# Patient Record
Sex: MALE | Race: BLACK OR AFRICAN AMERICAN | Employment: UNEMPLOYED | URBAN - METROPOLITAN AREA
[De-identification: names, ages, dates, MRNs, and addresses within clinical notes are randomized per-mention and may not be internally consistent; named-entity substitution may affect disease eponyms.]

---

## 2018-02-22 ENCOUNTER — HOSPITAL ENCOUNTER (INPATIENT)
Age: 28
LOS: 11 days | Discharge: HOME | DRG: 885 | End: 2018-03-05
Attending: PSYCHIATRY & NEUROLOGY | Admitting: STUDENT IN AN ORGANIZED HEALTH CARE EDUCATION/TRAINING PROGRAM
Payer: MEDICARE

## 2018-02-22 PROBLEM — F20.9 SCHIZOPHRENIA (HCC): Status: ACTIVE | Noted: 2018-02-22

## 2018-02-22 PROCEDURE — 65220000003 HC RM SEMIPRIVATE PSYCH

## 2018-02-22 RX ORDER — LORAZEPAM 1 MG/1
1-2 TABLET ORAL
Status: DISCONTINUED | OUTPATIENT
Start: 2018-02-22 | End: 2018-03-05 | Stop reason: HOSPADM

## 2018-02-22 RX ORDER — LORAZEPAM 2 MG/ML
1-2 INJECTION INTRAMUSCULAR
Status: DISCONTINUED | OUTPATIENT
Start: 2018-02-22 | End: 2018-03-05 | Stop reason: HOSPADM

## 2018-02-22 RX ORDER — HALOPERIDOL 5 MG/1
5 TABLET ORAL
Status: DISCONTINUED | OUTPATIENT
Start: 2018-02-22 | End: 2018-03-05 | Stop reason: HOSPADM

## 2018-02-22 RX ORDER — HALOPERIDOL 5 MG/ML
5 INJECTION INTRAMUSCULAR
Status: DISCONTINUED | OUTPATIENT
Start: 2018-02-22 | End: 2018-03-05 | Stop reason: HOSPADM

## 2018-02-22 RX ORDER — TRAZODONE HYDROCHLORIDE 50 MG/1
50 TABLET ORAL
Status: DISCONTINUED | OUTPATIENT
Start: 2018-02-22 | End: 2018-03-05 | Stop reason: HOSPADM

## 2018-02-22 RX ORDER — IBUPROFEN 400 MG/1
400 TABLET ORAL
Status: DISCONTINUED | OUTPATIENT
Start: 2018-02-22 | End: 2018-03-05 | Stop reason: HOSPADM

## 2018-02-22 NOTE — IP AVS SNAPSHOT
[id number] [address] Patient: Rodrigo Napoles MRN: [id number] :1990 About your hospitalization You were admitted on:  2018 You last received care in the:  [facility] You were discharged on:  2018 Why you were hospitalized Your primary diagnosis was:  Schizophrenia (Hcc) Follow-up Information Follow up With Details Comments Contact Info Pt. has an appointment with Dr. Radha Portillo for 3/21/18 @ 10:00 a.m. [facility] [address] [facility], [facility] Phone: [phone]. Pt given card with numbers to remember. Discharge Orders None A check efrem indicates which time of day the medication should be taken. My Medications START taking these medications Instructions Each Dose to Equal  
 Morning Noon Evening Bedtime  
 risperiDONE 4 mg tablet Commonly known as:  RisperDAL Your last dose was: Your next dose is: Take 1 Tab by mouth two (2) times a day. Indications: Schizophrenia  
 4 mg  
    
   
   
   
  
 traZODone 50 mg tablet Commonly known as:  Yamilex Scottie Your last dose was: Your next dose is: Take 1 Tab by mouth nightly as needed for Sleep. Indications: Insomnia 50 mg  
    
   
   
   
  
  
STOP taking these medications   
 benztropine 1 mg tablet Commonly known as:  COGENTIN  
   
  
 citalopram 20 mg tablet Commonly known as:  CELEXA  
   
  
 haloperidol 10 mg tablet Commonly known as:  HALDOL Where to Get Your Medications Information on where to get these meds will be given to you by the nurse or doctor. ! Ask your nurse or doctor about these medications  
  risperiDONE 4 mg tablet  
 traZODone 50 mg tablet Discharge Instructions BEHAVIORAL HEALTH NURSING DISCHARGE NOTE The following personal items collected during your admission are returned to you:  
Dental Appliance: Dental Appliances: None Vision: Visual Aid: None Hearing Aid:   
Jewelry: Jewelry: None Clothing: Clothing: Footwear, Jacket/Coat, Pants, Shirt, Undergarments Other Valuables: Other Valuables: Cell Phone, Terese Steele Valuables sent to safe:   
 
 
PATIENT INSTRUCTION: 
 
 
Regular diet The discharge information has been reviewed with the patient. The patient verbalized understanding. Patient armband removed and shredded Introducing [facility]! [facility] introduces [email or web address] patient portal. Now you can access parts of your medical record, email your doctor's office, and request medication refills online. 1. In your internet browser, go to [email or web address] 2. Click on the First Time User? Click Here link in the Sign In box. You will see the New Member Sign Up page. 3. Enter your [email or web address] Access Code exactly as it appears below. You will not need to use this code after youve completed the sign-up process. If you do not sign up before the expiration date, you must request a new code. · [email or web address] Access Code: [email or web address] Expires: 5/24/2018  8:10 AM 
 
4. Enter the last four digits of your Social Security Number (xxxx) and Date of Birth (mm/dd/yyyy) as indicated and click Submit. You will be taken to the next sign-up page. 5. Create a [email or web address] ID. This will be your [email or web address] login ID and cannot be changed, so think of one that is secure and easy to remember. 6. Create a [email or web address] password. You can change your password at any time. 7. Enter your Password Reset Question and Answer. This can be used at a later time if you forget your password. 8. Enter your e-mail address. You will receive e-mail notification when new information is available in [address] 9. Click Sign Up. You can now view and download portions of your medical record. 10. Click the Download Summary menu link to download a portable copy of your medical information. If you have questions, please visit the Frequently Asked Questions section of the [employer] website. Remember, [employer] is NOT to be used for urgent needs. For medical emergencies, dial 911. Now available from your iPhone and Android! Providers Seen During Your Hospitalization Provider Specialty Primary office phone Suhas Flores MD Psychiatry [phone] Your Primary Care Physician (PCP) Primary Care Physician Office Phone Office Fax [address] You are allergic to the following No active allergies Recent Documentation Height Weight BMI Smoking Status 1.803 m 73.9 kg 22.73 kg/m2 Never Smoker Emergency Contacts Name Discharge Info Relation Home Work Mobile [address] [facility] [6] Parent [1] [phone] Patient Belongings The following personal items are in your possession at time of discharge: 
  Dental Appliances: None  Visual Aid: None      Home Medications: None   Jewelry: None  Clothing: Footwear, Jacket/Coat, Pants, Shirt, Undergarments    Other Valuables: Chasity Hu Please provide this summary of care documentation to your next provider. Signatures-by signing, you are acknowledging that this After Visit Summary has been reviewed with you and you have received a copy. Patient Signature:  ____________________________________________________________ Date:  ____________________________________________________________  
  
Alannahellie Cohn Provider Signature:  ____________________________________________________________ Date:  ____________________________________________________________

## 2018-02-22 NOTE — H&P
History and Physical        Patient: Hank Irene               Sex: male          DOA: 2/22/2018         YOB: 1990      Age:  28 y.o.        LOS:  LOS: 0 days        HPI:     Hank Irene is a 28 y.o. male who was admitted under TDO experiencing paranoia, auditory and visual hallucinations, delusions, and aggressive behavior. Active Problems:    [facility] (2/22/2018)        Past Medical History:   Diagnosis Date    Paranoid schizophrenia ([facility]     Psychiatric disorder     Schizophrenia       No past surgical history on file. Family History   Problem Relation Age of Onset    Psychiatric Disorder Father        Social History     Social History    Marital status: SINGLE     Spouse name: N/A    Number of children: NONE    Years of education:  graduate     Social History Main Topics    Smoking status: Never Smoker    Smokeless tobacco: Not on file    Alcohol use No    Drug use: No    Sexual activity: No     Other Topics Concern    Not on file     Social History Narrative   Homeless. Appetite has been okay, sleep poor. He is unemployed. Prior to Admission medications    Medication Sig Start Date End Date Taking? Authorizing Provider   haloperidol (HALDOL) 10 mg tablet Take 10 mg by mouth nightly. Indications: SCHIZOPHRENIA    Geovanni Hendrix MD   citalopram (CELEXA) 20 mg tablet Take 20 mg by mouth daily. Geovanni Hendrix MD   benztropine (COGENTIN) 1 mg tablet Take 1 mg by mouth daily. Elke Calixto MD       No Known Allergies    Review of Systems  A comprehensive review of systems was negative. Physical Exam:      There were no vitals taken for this visit. Physical Exam:    General:  Alert, cooperative, well developed, well nourished male, no distress, appears stated age. Eyes:  Conjunctivae/corneas clear. PERRL, EOMs intact. Fundi benign   Ears:  Normal TMs and external ear canals both ears. Nose: Nares normal. Septum midline.  Mucosa normal. No drainage or sinus tenderness. Mouth/Throat: Lips, mucosa, and tongue normal. Teeth and gums normal.   Neck: Supple, symmetrical, trachea midline, no adenopathy, thyroid: no enlargement/tenderness/nodules, no carotid bruit and no JVD. Back:   Symmetric, no curvature. ROM normal. No CVA tenderness. Lungs:   Clear to auscultation bilaterally. Heart:  Regular rate and rhythm, S1, S2 normal, no murmur, click, rub or gallop. Abdomen:   Soft, non-tender. Bowel sounds normal. No masses,  No organomegaly. Extremities: Extremities normal, atraumatic, no cyanosis or edema. Pulses: 2+ and symmetric all extremities. Skin: Skin color, texture, turgor normal. No rashes or lesions   Lymph nodes: Cervical, supraclavicular, and axillary nodes normal.   Neurologic: CNII-XII intact. Normal strength, sensation and reflexes throughout.            Assessment/Plan     Paranoia  Delusional  Auditory and visual hallucinations  Labs reviewed  Treatment per physician's orders

## 2018-02-22 NOTE — PROGRESS NOTES
Patient admitted at this time under TDO he has been off his medications for several months. He is delusional and having auditory and visual hallucinations. He is guarded, speaks in a soft voice. He is having some thought blocking. Observed sitting and mumbling to himself. Oriented to unit.

## 2018-02-22 NOTE — BH NOTES
GROUP THERAPY PROGRESS NOTE    Lilli Villanueva is participating in Goals Group. Group time: 30 min    Personal goal for participation: Staying focus on achieving their goals. Goal orientation: personal    Group therapy participation: active    Therapeutic interventions reviewed and discussed: The importance of having goals in life. Impression of participation: Pt participated in group discussion.

## 2018-02-22 NOTE — IP AVS SNAPSHOT
Judit Chin 
 
 
 [address] Patient: Debra Jimenez MRN: [id number] :1990 A check efrem indicates which time of day the medication should be taken. My Medications START taking these medications Instructions Each Dose to Equal  
 Morning Noon Evening Bedtime  
 risperiDONE 4 mg tablet Commonly known as:  RisperDAL Your last dose was: Your next dose is: Take 1 Tab by mouth two (2) times a day. Indications: Schizophrenia  
 4 mg  
    
   
   
   
  
 traZODone 50 mg tablet Commonly known as:  Neri Mediate Your last dose was: Your next dose is: Take 1 Tab by mouth nightly as needed for Sleep. Indications: Insomnia 50 mg  
    
   
   
   
  
  
STOP taking these medications   
 benztropine 1 mg tablet Commonly known as:  COGENTIN  
   
  
 citalopram 20 mg tablet Commonly known as:  CELEXA  
   
  
 haloperidol 10 mg tablet Commonly known as:  HALDOL Where to Get Your Medications Information on where to get these meds will be given to you by the nurse or doctor. ! Ask your nurse or doctor about these medications  
  risperiDONE 4 mg tablet  
 traZODone 50 mg tablet

## 2018-02-23 PROCEDURE — 65220000003 HC RM SEMIPRIVATE PSYCH

## 2018-02-23 PROCEDURE — 74011250637 HC RX REV CODE- 250/637: Performed by: PSYCHIATRY & NEUROLOGY

## 2018-02-23 PROCEDURE — 74011250637 HC RX REV CODE- 250/637: Performed by: STUDENT IN AN ORGANIZED HEALTH CARE EDUCATION/TRAINING PROGRAM

## 2018-02-23 RX ORDER — RISPERIDONE 0.5 MG/1
0.5 TABLET, ORALLY DISINTEGRATING ORAL 2 TIMES DAILY
Status: DISCONTINUED | OUTPATIENT
Start: 2018-02-23 | End: 2018-02-24

## 2018-02-23 RX ADMIN — RISPERIDONE 0.5 MG: 0.5 TABLET, ORALLY DISINTEGRATING ORAL at 20:25

## 2018-02-23 RX ADMIN — RISPERIDONE 0.5 MG: 0.5 TABLET, ORALLY DISINTEGRATING ORAL at 12:48

## 2018-02-23 RX ADMIN — TRAZODONE HYDROCHLORIDE 50 MG: 50 TABLET ORAL at 20:25

## 2018-02-23 NOTE — BH NOTES
GROUP THERAPY PROGRESS NOTE    Lilli Villanueva is not  participating in [employer].      Group time: 30 minutes    Personal goal for participation: none     Goal orientation: community    Group therapy participation: passive    Therapeutic interventions reviewed and discussed: goals and procedures    Impression of participation: encouraged

## 2018-02-23 NOTE — BH NOTES
Patient has been visible within milieu for most of the evening and has been social with staff. Patient has no interaction with peers. Patient has been focused on snacks in the vending machines. Patient was provided with soda from vending machine per request. Patient at times has been observed pacing continuously throughout milieu. Patient appears to be responding to internal stimuli with patient laughing and talking quietly to self when sitting or standing in day area however denies auditory hallucinations. Patient denies suicidal ideation with no safety issues noted. Will monitor for safety with support as needed throughout treatment regimen.

## 2018-02-23 NOTE — BSMART NOTE
SOCIAL WORK GROUP THERAPY PROGRESS NOTE      Time: 11:30     Group Topic: Positive Self-Talk, Positive Changes     Group Participation: SW and tech. s encouraged pt to participate in todays group. Pt. declined to participate in todays group. Pt. Ws sitting at a table on the milieu.

## 2018-02-23 NOTE — BH NOTES
Pt was quiet and stayed to himself most of the shift. Pt participated in group activity and was med compliant. Staff will continue to monitor for safety and well being.

## 2018-02-23 NOTE — PROGRESS NOTES
Problem: Psychosis  Goal: *STG: Decreased hallucinations  Will have no or less hallucinations every shift during his hospitalization. Outcome: Progressing Towards Goal  Patient has denied auditory hallucinations. Goal: *STG/LTG: Complies with medication therapy  Will be compliant with taking his medications as ordered daily during his hospital stay. Outcome: Progressing Towards Goal  Patient has been compliant with prescribed medications. Comments: Patient has been compliant with prescribed medications and has been visible throughout the milieu. Patient appears to be responding to internal stimuli. Patient denies auditory hallucinations however makes odd gestures and is distracted throughout 1:1. Patient denies suicidal ideation and has not been able to clearly verbalize where he was living prior to the [employer] mission. Patient has been cooperative and has not presented as a management issues on the unit. Will monitor for safety with support as needed throughout treatment regimen.
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
## 2018-02-23 NOTE — BSMART NOTE
ACTIVITIES THERAPY PROGRESS NOTE    Group time:1230      The patient did not refuse verbally, but, did not come to group.

## 2018-02-23 NOTE — H&P
[address] [facility] [address]  Inpatient Admission Note    Date of Service:  02/23/18    Historian(s): Lilli Villanueva and chart review  Referral Source: TD)    Chief Complaint   Bizarre behaviors and psychosis     History of Present Illness     Lilli Lehman is a 28 y.o. male with history of schizophrenia who presented for inpatient psychiatric hospitalization after developing increasing bizarre behaviors in the context of medication non-compliance. On initial assessment, the pt is calm and guarded. He is restless and tends to rub the left side of his head and stick his finger in his ear. He answers questions minimally. He smiles inappropriately frequently throughout the interview. He tends to look to his sides and behind him as if looking at something that is not present. The pt feels \"the milligrams\" of his medicine is \"too high. \"  He reports wants to take a medication with the dosage in the \"single digits. \"  He does admit to seeing people from high school on his shoes which he noted, \"Was weird. \"      He denies symptoms consistent with excessive worry, OCD, depression and krzysztof. Pt denies feeling his body is under external control, thought insertion, thought withdrawal, thought broadcasting, and as if he can read minds or others can read his mind. He denies SI, HI and AVH. Psychiatric Review of Systems   Depression:  Denies depressed mood, episodic tearfulness, anhedonia and feelings of guilt, hopelessness, helplessness and worthlessness. Energy is adequate. Denies any thoughts of self-harm or suicidal ideation. Anxiety: Denies any excessive worrying, social anxiety, panic attacks and OCD. Irritability: Denies low threshold of frustration or anger. Bipolar symptoms: Denies history of decreased need for sleep associated with increased energy, racing thoughts, rapid speech and risky behavior.     Abuse/Trauma/PTSD: Denies history of verbal, physical or sexual abuse.  Denies avoidant behavior related to trauma triggers, flashbacks, hypervigilance or nightmares. Psychosis: Denies AVH or delusions. Medical Review of Systems     Sleep: stable and adequate  Appetite: stable and adequate    10 point review of systems was completed. Significant findings are found in the HPI or MSE. Psychiatric Treatment History     Self-injurious behavior/risky thoughts or behaviors (past suicidal ideation/attempt): Denies any prior history of thoughts of self-harm or suicidal actions. Violence/Risk to others (past homicidal ideation/attempt):    Denies any prior history of violence or homicidal ideation. Previous psychiatric medication trials: Endorses taking Seroquel in the past.     Previous psychiatric hospitalizations: Denies     Current therapist: Denies     Current psychiatric provider: Denies     Allergies    No Known Allergies    Medical History     Past Medical History:   Diagnosis Date    Paranoid schizophrenia ([facility] [address]     Psychiatric disorder     Schizophrenia       History of neurological illness: Denies  History of head injuries: Denies     Medication(s)     Prior to Admission Medications   Prescriptions Last Dose Informant Patient Reported? Taking?   benztropine (COGENTIN) 1 mg tablet Unknown at Unknown time  Yes No   Sig: Take 1 mg by mouth daily. citalopram (CELEXA) 20 mg tablet Unknown at Unknown time  Yes No   Sig: Take 20 mg by mouth daily. haloperidol (HALDOL) 10 mg tablet Unknown at Unknown time  Yes No   Sig: Take 10 mg by mouth nightly. Indications: SCHIZOPHRENIA      Facility-Administered Medications: None         Substance Abuse History     Tobacco: denied  Alcohol: endorses occasional use.    Marijuana: denied  Cocaine: denied  Opiate: denied  Benzodiazepine: denied  Other: denied    Consequences: none    History of detox: none    History of substance abuse treatment: none    Family History     Medical Family History  Maternal: Denies  Paternal: Denies    Psychiatric Family History  Maternal: Denies  Paternal: Denies    Family history of suicide? NO    Social History     [facility]    Employment: Not currently working, on disability. Education: graduated high school      Relationships/Children: Never , no children, not in a relationship    Legal: Denies     Spirituality/Zoroastrian: Mandaeism    Vitals/Labs    There were no vitals filed for this visit. Labs: UDS from OSH is negative for all substances. No other lab work located in paper chart. Mental Status Examination     Appearance/Hygiene 28 y.o. AAM with good hygiene   Behavior/Social Relatedness Vague, guarded, tends to speak with his jaws clenched   Musculoskeletal Gait/Station: appropriate  Tone (flaccid, cogwheeling, spastic): not assessed  Psychomotor (hyperkinetic, hypokinetic): restless  Involuntary movements (tics, dyskinesias, akathisia, stereotypies): none   Speech   Mumbled, garbled, poverty of speech noted. Mood   Bizarre   Affect    Bizarre    Thought Process Vague, guarded, thought blocking present       Vagueness, incoherence, circumstantiality, tangentiality, neologisms, perseveration, flight of ideas, or self-contradictory statements not present on assessment   Thought Content and Perceptual Disturbances Denies delusions, ideas of reference, overvalued ideas, ruminations, obsession, compulsions, and phobias    Denies self-injurious behavior (SIB), suicidal ideation (SI), aggressive behavior or homicidal ideation (HI)    Denies auditory and visual hallucinations   Sensorium and Cognition  Pt provided unintelligible answers    Insight  impaired due to psychosis   Judgment impaired due to psychosis       Suicide Risk Assessment     Admission  Date/Time: 02/23/18    [x] Admission  [] Discharge     Key Factors:   Current admission precipitated by suicide attempt?   []  Yes     2    [x]  No     1     Suicide Attempt History  [] Past attempts of high lethality    2 []  Past attempts of low lethality    1 [x]  No previous attempts       0   Suicidal Ideation []  Constant suicidal thoughts      2 []  Intermittent or fleeting suicidal  thoughts  1 [x]  Denies current suicidal thoughts    0   Suicide Plan   []  Has plan with actual OR potential access to planned method    2 []  Has plan without access to planned method      1 [x]  No plan            0   Plan Lethality []  Highly lethal plan (Carbon monoxide, gun, hanging, jumping)    2 []  Moderate lethality of plan          1 [x]  Low lethality of plan (biting, head banging, superficial scratching, pillow over face)  0   Safety Plan Agreement  []  Unwilling OR unable to agree due to impaired reality testing   2   []  Patient is ambivalent and/or guarded      1 [x]  Reliably agrees        0   Current Morbid Thoughts (reunion fantasies, preoccupations with death) []  Constantly     2     []  Frequently    1 [x]  Rarely    0   Elopement Risk  []  High risk     2 []  Moderate risk    1 [x]   Low risk    0   Symptoms    []  Hopeless  []  Helpless  []  Anhedonia   []  Guilt/shame  []  Anger/rage  []  Anxiety  []  Insomnia   []  Agitation   []  Impulsivity  []  5-6 symptoms present    2 []  3-4 symptoms present    1  [x]  0-2 symptoms present    0     Total Score: 1  --------------------------------------------------------------------------------------------------------------  Subjective Appraisal of Risk:  []  Patient replies not trustworthy: several non-verbal cues. [x]  Patient replies questionable: trustworthy: at least 1 non-verbal cue.  []  Patient replies appear trustworthy.     Protective measures (select all that apply):  []  Successful past responses to stress  [x]  Spiritual/Christian beliefs  []  Capacity for reality testing  []  Positive therapeutic relationships  []  Social supports/connections  []  Positive coping skills  []  Frustration tolerance/optimism  []  Children or pets in the home  []  Sense of responsibility to family  [x]  Agrees to treatment plan and follow up    High Risk Diagnoses (select all that apply):  []  Depression/Bipolar Disorder  []  Dual Diagnosis  []  Cardiovascular Disease  []  Schizophrenia  []  Chronic Pain  []  Epilepsy  []  Cancer  []  Personality Disorder  []  HIV/AIDS  []  Multiple Sclerosis    Dangerousness Assessment (Suicide, homicide, property destruction. ..)    Risk Factors reviewed and risk assessed to be:  [] low  [] low-moderate  [x] moderate   [] moderate-high  [] high     Protection factors reviewed and risk assessed to be:  [] low  [] low-moderate  [x] moderate   [] moderate-high  [] high     Response to treatment and risk assessed to be:  [] low  [] low-moderate  [x] moderate   [] moderate-high  [] high     Support reviewed and risk assessed to be:  [] low  [] low-moderate  [x] moderate   [] moderate-high  [] high     Acceptance of Discharge and outpatient treatment reviewed and risk assessed to be:    [] low  [] low-moderate  [x] moderate   [] moderate-high  [] high   Overall risk assessed to be:  [] low  [] low-moderate  [x] moderate   [] moderate-high  [] high       Assessment and Plan     Psychiatric Diagnoses:   Patient Active Problem List   Diagnosis Code    [facility] F20.9     Medical Diagnoses: None identified    Psychosocial and contextual factors:    1. Medication and treatment non-compliance. Level of impairment/disability: Severe    Lilli Scales is a 28 y.o. who is currently requiring acute stabilization after developing psychosis in the context of medication non-compliance. 1. Admit to locked inpatient behavioral health unit. Start milieu, group, art and occupation therapy. 2. Schizophrenia   - D/C Seroquel    - Start Risperidone M-tab 0.5mg po BID  3. Routine labs ordered and reviewed by this provider. 4. Reviewed instructions, risks, benefits and side effects.    5. Disposition/Follow-up: self-care/home, SW will assist in coordinating resources.   6. Tentative date of discharge: 2-       Kodak Butler MD  Psychiatrist  DR. [facility]

## 2018-02-23 NOTE — BH NOTES
BRIT Note: The above pt has been out in the day area but very isolative this shift with staff and peers this shift. He has been which appeared to be responding to internal stimuli this shift  While out in the day area this shift. He has been compliant with medications this shift. -A-Problem #1 cont. -P-Maintain a safe and supportive environment.

## 2018-02-24 LAB
ALBUMIN SERPL-MCNC: 3.7 G/DL (ref 3.4–5)
ALBUMIN/GLOB SERPL: 1.1 {RATIO} (ref 0.8–1.7)
ALP SERPL-CCNC: 39 U/L (ref 45–117)
ALT SERPL-CCNC: 11 U/L (ref 16–61)
ANION GAP SERPL CALC-SCNC: 6 MMOL/L (ref 3–18)
AST SERPL-CCNC: 12 U/L (ref 15–37)
BASOPHILS # BLD: 0 K/UL (ref 0–0.1)
BASOPHILS NFR BLD: 1 % (ref 0–2)
BILIRUB SERPL-MCNC: 0.4 MG/DL (ref 0.2–1)
BUN SERPL-MCNC: 14 MG/DL (ref 7–18)
BUN/CREAT SERPL: 15 (ref 12–20)
CALCIUM SERPL-MCNC: 9.2 MG/DL (ref 8.5–10.1)
CHLORIDE SERPL-SCNC: 107 MMOL/L (ref 100–108)
CHOLEST SERPL-MCNC: 114 MG/DL
CO2 SERPL-SCNC: 27 MMOL/L (ref 21–32)
CREAT SERPL-MCNC: 0.92 MG/DL (ref 0.6–1.3)
DIFFERENTIAL METHOD BLD: ABNORMAL
EOSINOPHIL # BLD: 0.1 K/UL (ref 0–0.4)
EOSINOPHIL NFR BLD: 2 % (ref 0–5)
ERYTHROCYTE [DISTWIDTH] IN BLOOD BY AUTOMATED COUNT: 13.6 % (ref 11.6–14.5)
EST. AVERAGE GLUCOSE BLD GHB EST-MCNC: 105 MG/DL
GLOBULIN SER CALC-MCNC: 3.5 G/DL (ref 2–4)
GLUCOSE SERPL-MCNC: 85 MG/DL (ref 74–99)
HBA1C MFR BLD: 5.3 % (ref 4.2–5.6)
HCT VFR BLD AUTO: 43 % (ref 36–48)
HDLC SERPL-MCNC: 37 MG/DL (ref 40–60)
HDLC SERPL: 3.1 {RATIO} (ref 0–5)
HGB BLD-MCNC: 14.2 G/DL (ref 13–16)
LDLC SERPL CALC-MCNC: 62.4 MG/DL (ref 0–100)
LIPID PROFILE,FLP: ABNORMAL
LYMPHOCYTES # BLD: 1.2 K/UL (ref 0.9–3.6)
LYMPHOCYTES NFR BLD: 41 % (ref 21–52)
MCH RBC QN AUTO: 28.5 PG (ref 24–34)
MCHC RBC AUTO-ENTMCNC: 33 G/DL (ref 31–37)
MCV RBC AUTO: 86.3 FL (ref 74–97)
MONOCYTES # BLD: 0.2 K/UL (ref 0.05–1.2)
MONOCYTES NFR BLD: 6 % (ref 3–10)
NEUTS SEG # BLD: 1.5 K/UL (ref 1.8–8)
NEUTS SEG NFR BLD: 50 % (ref 40–73)
PLATELET # BLD AUTO: 414 K/UL (ref 135–420)
PMV BLD AUTO: 10.1 FL (ref 9.2–11.8)
POTASSIUM SERPL-SCNC: 4.4 MMOL/L (ref 3.5–5.5)
PROT SERPL-MCNC: 7.2 G/DL (ref 6.4–8.2)
RBC # BLD AUTO: 4.98 M/UL (ref 4.7–5.5)
SODIUM SERPL-SCNC: 140 MMOL/L (ref 136–145)
TRIGL SERPL-MCNC: 73 MG/DL (ref ?–150)
TSH SERPL DL<=0.05 MIU/L-ACNC: 1.29 UIU/ML (ref 0.36–3.74)
VLDLC SERPL CALC-MCNC: 14.6 MG/DL
WBC # BLD AUTO: 3 K/UL (ref 4.6–13.2)

## 2018-02-24 PROCEDURE — 84443 ASSAY THYROID STIM HORMONE: CPT | Performed by: PSYCHIATRY & NEUROLOGY

## 2018-02-24 PROCEDURE — 36415 COLL VENOUS BLD VENIPUNCTURE: CPT | Performed by: PSYCHIATRY & NEUROLOGY

## 2018-02-24 PROCEDURE — 85025 COMPLETE CBC W/AUTO DIFF WBC: CPT | Performed by: PSYCHIATRY & NEUROLOGY

## 2018-02-24 PROCEDURE — 80061 LIPID PANEL: CPT | Performed by: PSYCHIATRY & NEUROLOGY

## 2018-02-24 PROCEDURE — 65220000003 HC RM SEMIPRIVATE PSYCH

## 2018-02-24 PROCEDURE — 74011250637 HC RX REV CODE- 250/637: Performed by: PSYCHIATRY & NEUROLOGY

## 2018-02-24 PROCEDURE — 83036 HEMOGLOBIN GLYCOSYLATED A1C: CPT | Performed by: PSYCHIATRY & NEUROLOGY

## 2018-02-24 PROCEDURE — 80053 COMPREHEN METABOLIC PANEL: CPT | Performed by: PSYCHIATRY & NEUROLOGY

## 2018-02-24 RX ORDER — HYDROXYZINE PAMOATE 25 MG/1
25 CAPSULE ORAL
Status: DISCONTINUED | OUTPATIENT
Start: 2018-02-24 | End: 2018-03-05 | Stop reason: HOSPADM

## 2018-02-24 RX ORDER — RISPERIDONE 1 MG/1
1 TABLET, ORALLY DISINTEGRATING ORAL 2 TIMES DAILY
Status: DISCONTINUED | OUTPATIENT
Start: 2018-02-24 | End: 2018-02-25

## 2018-02-24 RX ADMIN — RISPERIDONE 1 MG: 1 TABLET, ORALLY DISINTEGRATING ORAL at 20:36

## 2018-02-24 RX ADMIN — RISPERIDONE 1 MG: 1 TABLET, ORALLY DISINTEGRATING ORAL at 08:50

## 2018-02-24 NOTE — BH NOTES
MHT Note:  Lilli has participated in all unit activities this shift. He returns to his room to lie down off an one in between activities and at times pace the unit. He can also be needy of staff attention at times; standing at the nurse's station and MHT desk for long periods of time just watching activity and needing redirection. He does accept redirection easily. He admits to ongoing auditory and visual hallucinations and this is why goes to his bed frequently. He is cooperative with staff and compliant with unit guidelines and not management problem. Patient did not receive any visitors this shift. Staff will continue to monitor for safety and location.

## 2018-02-24 NOTE — PROGRESS NOTES
[address]  Inpatient Progress Note     Date of Service: 02/24/18  Hospital Day: 2     Subjective/Interval History   02/24/18    Treatment Team Notes:  Notes reviewed and/or discussed and report that Lilli Villanueva demonstrated no interval concerns. SW spoke with pt's father who noted the pt can not return to his home. Patient interview: Lilli Villanueva was interviewed by this writer today. More talkative today. Appears anxious. Remaining around nurses station. Endorses AVH of people being out to get him. Sleep and appetite are adequate. Pt is medication compliant and denies medication side effects including TD, EPS, akathisia and mood derangements. Denies SI and HI. Asking repeatedly about the union mission in [place]. Objective   There were no vitals filed for this visit. Mental Status Examination     Appearance/Hygiene 28 y.o. AAM with good hygiene   Behavior/Social Relatedness Cooperative and answers questions more readily today. Does not clench teeth when speaking. Musculoskeletal Gait/Station: appropriate  Tone (flaccid, cogwheeling, spastic): not assessed  Psychomotor (hyperkinetic, hypokinetic): restless  Involuntary movements (tics, dyskinesias, akathisia, stereotypies): none   Speech                          Speech more clear today. Mood                          Anxious   Affect                                                   Anxious   Thought Process Less thought blocking noted. perseverative re: [facility]. Thought Content and Perceptual Disturbances +AVH  Denies SI and HI.       Sensorium and Cognition              Grossly intact   Insight              poor   Judgment poor         Assessment/Plan      Psychiatric Diagnoses:   Patient Active Problem List   Diagnosis Code    Schizophrenia ([facility] [address] F20.9     Medical Diagnoses: None identified     Psychosocial and contextual factors:                         1. Medication and treatment non-compliance.      Level of impairment/disability: Moderate-Severe    Lilli Villa is a 28 y.o. who is currently improving. Pt admits to [address] [place] [address] Way today. 1.  Schizophrenia   - Increase risperidone M-tab to 1mg po BID  2. Start Vistaril 25mg TID/prn for anxiety. 3.  Reviewed instructions, risks, benefits and side effects of medications  4.   Disposition/Discharge Date: self-care/home, 2-    Doreen Cooper MD DR. [facility]  Psychiatry

## 2018-02-24 NOTE — BH NOTES
Admits to [facility]. Denies SI HI VH. Eats and tolerates evening meal. Distracted preoccupied keeps to self but, cooperative. Takes medicines without incident. Gripper socks and 15 minute checks in place for safety. Will continue to monitor and support.

## 2018-02-24 NOTE — BH NOTES
GROUP THERAPY PROGRESS NOTE    Rogerio Rouse is participating in [place].      Group time: 30 minutes    Personal goal for participation: discuss guideline compliance, unit issues and community announcements    Goal orientation: community    Group therapy participation: active

## 2018-02-24 NOTE — PROGRESS NOTES
Problem: Psychosis  Goal: *STG: Remains safe in hospital  Will remain safe, will demonstrate safe behaviors every shift throughout his hospital stay. Outcome: Progressing Towards Goal  Patient remains safe in hospital daily. Goal: *STG/LTG: Complies with medication therapy  Will be compliant with taking his medications as ordered daily during his hospital stay. Outcome: Progressing Towards Goal  Patient complies with medication therapy daily. Problem: Falls - Risk of  Goal: *Absence of Falls  Document Paty Fall Risk and appropriate interventions in the flowsheet. Will remain free from falls daily during his hospital stay. Outcome: Progressing Towards Goal  Fall Risk Interventions: patient absent of falls daily            Medication Interventions: Teach patient to arise slowly                  Comments: Remains safe in hospital daily. Absent of falls daily. Complies with medication therapy daily.

## 2018-02-24 NOTE — BH NOTES
GROUP THERAPY PROGRESS NOTE    Lilli WEIR ShakaFaheem is participating in Nursing Group. Group time: 30 minutes    Personal goal for participation: Goal was to express current feelings. Goal orientation: personal    Group therapy participation: active    Therapeutic interventions reviewed and discussed: Purpose of groups was to be able to express feelings in constructive manor. Impression of participation: Lilli was active and participated in group through art expressing current feelings.

## 2018-02-25 PROCEDURE — 65220000003 HC RM SEMIPRIVATE PSYCH

## 2018-02-25 PROCEDURE — 74011250637 HC RX REV CODE- 250/637: Performed by: PSYCHIATRY & NEUROLOGY

## 2018-02-25 RX ORDER — RISPERIDONE 1 MG/1
2 TABLET, ORALLY DISINTEGRATING ORAL 2 TIMES DAILY
Status: DISCONTINUED | OUTPATIENT
Start: 2018-02-25 | End: 2018-02-28

## 2018-02-25 RX ADMIN — RISPERIDONE 1 MG: 1 TABLET, ORALLY DISINTEGRATING ORAL at 08:19

## 2018-02-25 RX ADMIN — RISPERIDONE 2 MG: 1 TABLET, ORALLY DISINTEGRATING ORAL at 20:34

## 2018-02-25 NOTE — PROGRESS NOTES
[address]  Inpatient Progress Note     Date of Service: 02/25/18  Hospital Day: 3     Subjective/Interval History   02/25/18    Treatment Team Notes:  Notes reviewed and/or discussed and report that Lilli Villanueva demonstrated no interval concerns. Accepts redirection easily. Endorses AH. Denies SI, HI & VH. Distracted and preoccupied. Patient interview: Lilli Villanueva was interviewed by this writer today. More talkative today. In bed, calm and cooperative. Endorses AVH but does not discuss in detail. Socially awkward at times. Shakes this provider's hand frequently. Appears distracted. Appears to respond to internal stimuli. Perseverative: re [employer]. Objective     Vitals:    02/24/18 0850 02/24/18 1932   BP: 105/70 110/75   Pulse: 80 83   Resp: 18 18   Temp: 97.4 °F (36.3 °C) 98 °F (36.7 °C)       Mental Status Examination     Appearance/Hygiene 28 y.o. AAM with good hygiene   Behavior/Social Relatedness Cooperative and answers questions more readily today. Some clenching of teeth when speaking. Musculoskeletal Gait/Station: appropriate  Tone (flaccid, cogwheeling, spastic): not assessed  Psychomotor (hyperkinetic, hypokinetic): restless  Involuntary movements (tics, dyskinesias, akathisia, stereotypies): none   Speech                          Speech more clear today. Mood                          Calm    Affect                                                   Calm   Thought Process Distracted, +thought blocking. perseverative re: [facility]. Thought Content and Perceptual Disturbances +AVH  Denies SI and HI.       Sensorium and Cognition              Grossly intact   Insight              poor   Judgment poor         Assessment/Plan      Psychiatric Diagnoses:   Patient Active Problem List   Diagnosis Code    Schizophrenia ([facility] [address] F20.9     Medical Diagnoses: None identified     Psychosocial and contextual factors: 1.  Medication and treatment non-compliance.      Level of impairment/disability: Moderate-Severe    Lilli Villanueva is a 28 y.o. who is currently improving. Pt admits to [address] [place], [place] [address] today. 1.  Schizophrenia   - Increase risperidone M-tab to 2mg po BID  2. Continue Vistaril 25mg TID/prn for anxiety. 3.  Reviewed instructions, risks, benefits and side effects of medications  4.   Disposition/Discharge Date: self-care/home, 2-    Chikis Lebron MD DR. [facility]  [facility]iatry

## 2018-02-25 NOTE — BH NOTES
GROUP THERAPY PROGRESS NOTE    Lilli DESTIN Rich is participating in Relaxation group. Group time: 30 min    Personal goal for participation: Making time to relax. Goal orientation: relaxation    Group therapy participation: active    Therapeutic interventions reviewed and discussed: Ways to relax. Impression of participation: Pt participated in group activity playing a game with another peer.

## 2018-02-25 NOTE — BH NOTES
Denies SI HI. Admits to AVH. Offers no complaints. Disorganized. Cooperative. Eats and tolerates evening meal. Takes medicines without incident. Gripper socks and 15 minute checks in place for safety. Will continue to monitor and support.

## 2018-02-25 NOTE — BH NOTES
Pt isolative, in room much of shift; mood calm, affect fair;  often appears preoccupied, redundant handshaking behavior, cooperative and med/group compliant, interaction increasing, tolerated meals well, no behavioral issues during shift. Endorses hearing voices, denies visual hallucinations and intent to harm self/others, involved in no falls this shift - skid resistant footwear utilized and floor kept free of items that could precipitate a fall.

## 2018-02-25 NOTE — PROGRESS NOTES
Problem: Psychosis  Goal: *STG: Remains safe in hospital  Will remain safe, will demonstrate safe behaviors every shift throughout his hospital stay. Outcome: Progressing Towards Goal  Stays safe in hospital daily. Goal: *STG/LTG: Complies with medication therapy  Will be compliant with taking his medications as ordered daily during his hospital stay. Outcome: Progressing Towards Goal  Complies with medication therapy daily. Problem: Falls - Risk of  Goal: *Absence of Falls  Document Paty Fall Risk and appropriate interventions in the flowsheet. Will remain free from falls daily during his hospital stay. Outcome: Progressing Towards Goal  Fall Risk Interventions: absent of falls daily. Medication Interventions: Teach patient to arise slowly                  Comments: Stays safe in hospital daily. Complies with medicines daily. Absent of falls daily.

## 2018-02-25 NOTE — BH NOTES
GROUP THERAPY PROGRESS NOTE    Rodrigo Napoles is participating in [place].      Group time: 30 minutes    Personal goal for participation: discuss guideline compliance, unit issues and community announcements; discuss daily Tx goal(s)                 Goal orientation: personal    Group therapy participation: minimal

## 2018-02-26 PROCEDURE — 65220000003 HC RM SEMIPRIVATE PSYCH

## 2018-02-26 PROCEDURE — 74011250637 HC RX REV CODE- 250/637: Performed by: PSYCHIATRY & NEUROLOGY

## 2018-02-26 RX ADMIN — RISPERIDONE 2 MG: 1 TABLET, ORALLY DISINTEGRATING ORAL at 20:18

## 2018-02-26 RX ADMIN — RISPERIDONE 2 MG: 1 TABLET, ORALLY DISINTEGRATING ORAL at 08:17

## 2018-02-26 NOTE — BSMART NOTE
SOCIAL WORK GROUP THERAPY PROGRESS NOTE    Group Time:  10:15am       Group Topic:  Coping Skills    C D Issues    Group Participation:      Pt minimally involved during group discussion but remained attentive. Stated was tired \"but I'll listen\". Did answer some questions but did not want to discuss. Looked at the \"Process of setting Goals\", the value of a \"daily\" /  \"weekly\" schedule as tool to build self esteem, sharpen decision making skills and help define one's reality.

## 2018-02-26 NOTE — BH NOTES
BANGHWOJCIECH Note: S/O The above pt has been pleasant but quiet the entire shift with staff and peers this shift. He has been quiet the entire shift with staff and peers. He has participated in groups this shift. He has been compliant with medications with no concerns this shift at this time. He has not experienced any falls during this shift. He still continue to be very isolative but not a management problem this shift. He verbally contract for safety and denies any self-harm at this time. -A- Problem#1 cont. -P-Maintain a safe and supportive environment.

## 2018-02-26 NOTE — BSMART NOTE
ACTIVITIES THERAPY PROGRESS NOTE    Group time:1220    The patient did not awaken/get up when called for group.

## 2018-02-26 NOTE — BSMART NOTE
Pt. Is a 28year old male with history of Schizophrenia in addition to Autism. Pt was admitted to this facility for paranoia, hallucinations and aggression. RADHA Collaterals:  RADHA contacted DOM [facility] staff at @ [phone] to provide her with an update. CM stated the plan is to step pt. down to REACH services , once he is stable. SW discussed case with the treating psychiatrist.    Vicki Cervantes:  RADHA talked to pt this a.m to discuss d/c planning. The pt. Stated he was staying in foster care in [place], [place] prior to moving her with his father. Pt stated he was in foster care during that time. Pt. Admits he was taking medications at that time. Pt. Stated he graduated from high school when he was 22years old. Pt received mental health services in [place], [place]. Pt. stated he moved with his father 4 years ago. Pt. Admits he has not been compliant with medications. Pt expressed he moved to the [facility]  Because he was not getting along with his father. Pt. Stated he is waiting to get an apartment with the [employer] program.  Marty Castillo discussed REACH services with thept. SW discussed the importance of medication and treatment compliance in the community. Pt is currently denying ideations but reports auditory hallucinations. Pt. Was cooperative, thought blocking and guarded    RADHA Collateral: ALIREZA Alegre pt.'s CM with [facility] contacted this SW.  CM was provided an update on the pt.';s treatment and care. CM stated she is assisting the pt. With placement in the community. Joesph Melendez

## 2018-02-26 NOTE — PROGRESS NOTES
[address]  Inpatient Progress Note     Date of Service: 02/26/18  Hospital Day: 4     Subjective/Interval History   02/26/18    Treatment Team Notes:  Notes reviewed and/or discussed and report that Lilli WEIR ZamoranoBenitoFaheem distracted and preoccupied. Redundant handshaking behaviors. Interaction increasing. +AH . Intermittent VH. Denies SI and HI. Patient interview: Lilli Villanueva was interviewed by this writer today. Endorses unchanged command AH telling him to harm himself. Endorses unchanged VH of people. Continues to discuss paranoia. Perseverative and focused on staying at the [facility] and bus pass to get there. Denies SI and HI. Objective     Vitals:    02/24/18 1932 02/25/18 0745 02/25/18 0842 02/25/18 1932   BP: 110/75 105/71  104/70   Pulse: 83 95  74   Resp: 18 18  17   Temp: 98 °F (36.7 °C) 97.3 °F (36.3 °C)  97.5 °F (36.4 °C)   Weight:   73.9 kg (163 lb)        Mental Status Examination     Appearance/Hygiene 28 y.o. AAM with good hygiene   Behavior/Social Relatedness Cooperative, bizarre relatedness but this appears to be improving. Musculoskeletal Gait/Station: appropriate  Tone (flaccid, cogwheeling, spastic): not assessed  Psychomotor (hyperkinetic, hypokinetic): calm, cooperative. Involuntary movements (tics, dyskinesias, akathisia, stereotypies): none   Speech                          Perseverative   Mood                          Calm    Affect                                                   Calm   Thought Process Distracted, +thought blocking but improving. perseverative re: [facility]. Thought Content and Perceptual Disturbances +AVH  Denies SI and HI.       Sensorium and Cognition              Grossly intact   Insight              poor   Judgment poor         Assessment/Plan      Psychiatric Diagnoses:   Patient Active Problem List   Diagnosis Code    Schizophrenia ([facility] [place] [address] F20.9     Medical Diagnoses: None identified     Psychosocial and contextual factors:                         1.  Medication and treatment non-compliance.      Level of impairment/disability: Moderate    Lilli Villanueva is a 28 y.o. who is currently improving. Pt admits to [address] [place] [address] today. 1.  Schizophrenia   - Continue risperidone M-tab to 2mg po BID  2. Continue Vistaril 25mg TID/prn for anxiety. 3.  Reviewed instructions, risks, benefits and side effects of medications  4.   Disposition/Discharge Date: self-care/home, 2-    Nick Gould MD DR. [facility]  [facility]iatry

## 2018-02-26 NOTE — BH NOTES
GROUP THERAPY PROGRESS NOTE    Nazia Roche is participating in [employer].      Group time: 30 minutes    Personal goal for participation: talk to my Dr    Goal orientation: community    Group therapy participation: minimal    Therapeutic interventions reviewed and discussed: goals and procedures    Impression of participation: encourage

## 2018-02-27 ENCOUNTER — APPOINTMENT (OUTPATIENT)
Dept: GENERAL RADIOLOGY | Age: 28
DRG: 885 | End: 2018-02-27
Attending: PSYCHIATRY & NEUROLOGY
Payer: MEDICARE

## 2018-02-27 PROCEDURE — 65220000003 HC RM SEMIPRIVATE PSYCH

## 2018-02-27 PROCEDURE — 71046 X-RAY EXAM CHEST 2 VIEWS: CPT

## 2018-02-27 PROCEDURE — 74011250637 HC RX REV CODE- 250/637: Performed by: PSYCHIATRY & NEUROLOGY

## 2018-02-27 RX ADMIN — RISPERIDONE 2 MG: 1 TABLET, ORALLY DISINTEGRATING ORAL at 20:46

## 2018-02-27 RX ADMIN — RISPERIDONE 2 MG: 1 TABLET, ORALLY DISINTEGRATING ORAL at 08:02

## 2018-02-27 NOTE — PROGRESS NOTES
Problem: Psychosis  Goal: *STG: Decreased hallucinations  Will have no or less hallucinations every shift during his hospitalization. Outcome: Not Progressing Towards Goal  Patient stated that he \"still hears the voices and sees stuff at times. \"  Goal: *STG: Remains safe in hospital  Will remain safe, will demonstrate safe behaviors every shift throughout his hospital stay. Outcome: Progressing Towards Goal  Demonstrated safe behavior throughout shift; monitored for safe per protocol. Goal: *STG: Participates in individual and group therapy  Will attend/ participate in at least 2-3 groups daily during his hospitalization. Outcome: Progressing Towards Goal  Attended pm group  Goal: *STG/LTG: Complies with medication therapy  Will be compliant with taking his medications as ordered daily during his hospital stay. Outcome: Progressing Towards Goal  Medication compliant. Problem: Falls - Risk of  Goal: *Absence of Falls  Document Paty Fall Risk and appropriate interventions in the flowsheet. Will remain free from falls daily during his hospital stay. Outcome: Progressing Towards Goal  Fall Risk Interventions:        Medication Interventions: Teach patient to arise slowly         Patient remains free from falls. Comments: Patient is alert and oriented to self and time, reoriented to place. Patient exhibits flat affect, even though he smiled quiet often, \"little sad\" mood, but does not want to talk about \"sad\" mood. Patient verbalized that he \"still hears voices and sees stuff. \"  Patient does not elaborate about the hallucinations. Patient received a visit from his father this shift, \"good\" visit. Patient is medication compliant; denied thoughts of harm to self or others at this time; will monitor and maintain safe environment.

## 2018-02-27 NOTE — BSMART NOTE
Pt. Is a 28year old male with history of Schizophrenia in addition to Autism. Pt was admitted to this facility for paranoia, hallucinations and aggression.        RADHA discussed case with the treating psychiatrist    SW Collateral:  RADHA [facility] DOM Mayen [facility] Mrs. Rod Finch was provided an update on the pt. Mrs. Rod Finch met with pt. . Mrs. Rod Finch stated pt. appears to be showing improvement. The pt. will be stepped down to the REACH program on 3/2/18. RADHA discussed case with the treating psychiatrist.     Nyasia Pickardant:  RADHA talked to pt this a.m to discuss d/c planning. The pt. stated he was feeling better. Pt stated he met with Mrs. Ashlie Verduzco with REACH program. Pt. stated she explained to him about REACH services. Pt. reports he is not certain if he would like to step down to the REACH program. Pt. stated he does not want to loose his spot ate [employer]. Pt was reassured he will not loose his spot. RADHA informed pt , the REACH program and community CM can assist the pt with finding permit housing in the community. Pt. was provided positive feedback. Pt. is currently denying ideations but continues to have hallucinations. RADHA discussed the importance of medication and treatment compliance in the community  Pt.  Was cooperative, thought blocking and guarded

## 2018-02-27 NOTE — BH NOTES
GROUP THERAPY PROGRESS NOTE    Lilli Villanueva is participating in Substance abuse.      Group time: 30 minutes    Personal goal for participation: Drug Awareness and the Importance of Hygiene Care    Goal orientation: social    Group therapy participation: active    Therapeutic interventions reviewed and discussed: Patient was encourage by staff    Impression of participation: Positive and Calm

## 2018-02-27 NOTE — BSMART NOTE
SOCIAL WORK GROUP THERAPY PROGRESS NOTE    Group Time:  10:15am       Group Topic:  Coping Skills    C D Issues    Group Participation:      Pt moderately involved during group discussion but remained attentive. Discussion included the process of making \"Change\" by answering questions on handout with an emphasis on strengths & weaknesses to support improving one's self esteem. Wants to work on diet & try to make a \"daily\" schedule.

## 2018-02-27 NOTE — PROGRESS NOTES
[address]  Inpatient Progress Note     Date of Service: 02/27/18  Hospital Day: 5     Subjective/Interval History   02/27/18    Treatment Team Notes:  Notes reviewed and/or discussed and report that Lilli Villanueva endorses +AVH. Denies SI and HI. Patient interview: Lilli Villanueva was interviewed by this writer today. Feels medications are helpful. Laughing appropriately with staff. Continues to endorses unchanged command AH telling him to harm himself and paranoia. Denies VH today. Pt does not mention the [employer] mission today. Denies SI, HI and VH. Objective     Vitals:    02/25/18 0842 02/25/18 1932 02/26/18 0734 02/26/18 2017   BP:  104/70 125/77 104/72   Pulse:  74 79 78   Resp:  17 16 18   Temp:  97.5 °F (36.4 °C) 97.4 °F (36.3 °C) 98 °F (36.7 °C)   Weight: 73.9 kg (163 lb)          Mental Status Examination     Appearance/Hygiene 28 y.o. AAM with good hygiene   Behavior/Social Relatedness Cooperative, bizarre relatedness but this appears to be improving. Musculoskeletal Gait/Station: appropriate  Tone (flaccid, cogwheeling, spastic): not assessed  Psychomotor (hyperkinetic, hypokinetic): calm, cooperative. Involuntary movements (tics, dyskinesias, akathisia, stereotypies): none   Speech                          Perseverative   Mood                          Calm    Affect                                                   Calm   Thought Process Distracted, +thought blocking but improving. perseverative re: [facility]. Thought Content and Perceptual Disturbances +command AH  Denies VH. Denies SI and HI.       Sensorium and Cognition              Grossly intact   Insight              poor   Judgment poor         Assessment/Plan      Psychiatric Diagnoses:   Patient Active Problem List   Diagnosis Code    Schizophrenia ([facility] [address] F20.9     Medical Diagnoses: None identified     Psychosocial and contextual factors: 1.  Medication and treatment non-compliance.      Level of impairment/disability: Moderate    Lilli Villanueva is a 28 y.o. who is currently improving. Pt admits to [facility] today. Denies SI, HI and VH. 1.  Schizophrenia   - Continue risperidone M-tab to 2mg po BID  2. Continue Vistaril 25mg TID/prn for anxiety. 3.  Reviewed instructions, risks, benefits and side effects of medications  4.   Disposition/Discharge Date: self-care/home, 3-2-2018    Keyana French MD DR. [facility]  Psychiatry

## 2018-02-27 NOTE — PROGRESS NOTES
Problem: Psychosis  Goal: *STG: Decreased hallucinations  Will have no or less hallucinations every shift during his hospitalization. Outcome: Progressing Towards Goal  Patient states the voices are not as loud. Goal: *STG: Remains safe in hospital  Will remain safe, will demonstrate safe behaviors every shift throughout his hospital stay. Outcome: Progressing Towards Goal  Remains safe  Goal: *STG: Participates in individual and group therapy  Will attend/ participate in at least 2-3 groups daily during his hospitalization. Outcome: Progressing Towards Goal  Attending groups  Goal: *STG/LTG: Complies with medication therapy  Will be compliant with taking his medications as ordered daily during his hospital stay. Outcome: Progressing Towards Goal  Medications compliant. Comments: Patient states he is doing better. States he still hear voices but they are not bad , describes as saying  their soft. Medication compliant.

## 2018-02-27 NOTE — BH NOTES
GROUP THERAPY PROGRESS NOTE    Lilli Villanueva is participating in Positive thoughts. Group time: 30 min    Personal goal for participation: Being able to develop a good thought process. Goal orientation: personal    Group therapy participation: active    Therapeutic interventions reviewed and discussed: The importance of having positive thoughts. Impression of participation: Pt participated in group activity discussion.

## 2018-02-28 PROCEDURE — 65220000005 HC RM SEMIPRIVATE PSYCH 3 OR 4 BED

## 2018-02-28 PROCEDURE — 74011250637 HC RX REV CODE- 250/637: Performed by: PSYCHIATRY & NEUROLOGY

## 2018-02-28 RX ORDER — RISPERIDONE 1 MG/1
3 TABLET, ORALLY DISINTEGRATING ORAL 2 TIMES DAILY
Status: DISCONTINUED | OUTPATIENT
Start: 2018-02-28 | End: 2018-03-02

## 2018-02-28 RX ADMIN — RISPERIDONE 3 MG: 1 TABLET, ORALLY DISINTEGRATING ORAL at 20:38

## 2018-02-28 RX ADMIN — RISPERIDONE 2 MG: 1 TABLET, ORALLY DISINTEGRATING ORAL at 08:08

## 2018-02-28 NOTE — BH NOTES
Patient attended group today, he was pleasant and co-operative, he ate all meals and took his medication. Staff will continue monitor patient.

## 2018-02-28 NOTE — PROGRESS NOTES
[address]  Inpatient Progress Note     Date of Service: 02/28/18  Hospital Day: 6     Subjective/Interval History   02/28/18    Treatment Team Notes:  Notes reviewed and/or discussed and report that Lilli Villanueva demonstrated no interval concerns. Medication compliant. Attending groups. Eating all of his meals. +AH but notes they are improving. Patient interview: Lilli Villanueva was interviewed by this writer today. +AH States hallucinations are decreasing. Feels medications are helpful. Denies SI, HI and VH. Objective     Vitals:    02/26/18 2017 02/27/18 0755 02/27/18 1405 02/27/18 2028   BP: 104/72 109/81  112/84   Pulse: 78 90  88   Resp: 18 14  18   Temp: 98 °F (36.7 °C) 96.7 °F (35.9 °C)  98.2 °F (36.8 °C)   Weight:       Height:   5' 10.98\" (1.803 m)        Mental Status Examination     Appearance/Hygiene 28 y.o. AAM with good hygiene   Behavior/Social Relatedness Cooperative, bizarre relatedness but this appears to be improving. Musculoskeletal Gait/Station: appropriate  Tone (flaccid, cogwheeling, spastic): not assessed  Psychomotor (hyperkinetic, hypokinetic): calm, cooperative. Involuntary movements (tics, dyskinesias, akathisia, stereotypies): none   Speech                          Perseverative   Mood                          Calm    Affect                                                   Calm   Thought Process Distracted, +thought blocking but improving. perseverative re: [facility]. Thought Content and Perceptual Disturbances +AH  Denies VH. Denies SI and HI.       Sensorium and Cognition              Grossly intact   Insight              poor   Judgment poor         Assessment/Plan      Psychiatric Diagnoses:   Patient Active Problem List   Diagnosis Code    Schizophrenia ([facility] [place] [address] F20.9     Medical Diagnoses: None identified     Psychosocial and contextual factors:                         1.  Medication and treatment non-compliance.      Level of impairment/disability: Moderate    Lilli Villanueva is a 28 y.o. who is currently improving. Pt admits to [facility] today. Denies SI, HI and VH. 1.  Schizophrenia   - increase risperidone M-tab to 3mg po BID  2. Continue Vistaril 25mg TID/prn for anxiety. 3.  Reviewed instructions, risks, benefits and side effects of medications  4.   Disposition/Discharge Date: self-care/home, 3-2-2018    Jose Luis Felix MD DR. [facility]  Psychiatry

## 2018-02-28 NOTE — PROGRESS NOTES
Problem: Psychosis  Goal: *STG: Decreased hallucinations  Will have no or less hallucinations every shift during his hospitalization. Outcome: Progressing Towards Goal  auditory  Goal: *STG: Remains safe in hospital  Will remain safe, will demonstrate safe behaviors every shift throughout his hospital stay. Outcome: Progressing Towards Goal  Remains safe  Goal: *STG/LTG: Complies with medication therapy  Will be compliant with taking his medications as ordered daily during his hospital stay. Outcome: Progressing Towards Goal  compliant    Comments: Patient pleasant on approach. He is taking his medications , attending groups. Hygiene is appropriate. Hearing voices but are softer.

## 2018-02-28 NOTE — BSMART NOTE
ACTIVITIES THERAPY PROGRESS NOTE    Group time:3138    The patient did not refuse, but, did not come to group.

## 2018-02-28 NOTE — INTERDISCIPLINARY ROUNDS
Treatment team met -     Medical Director: _____present   Psychiatrist: __x___present   Charge nurse: __x___present   MSW: __x___present   : _____present   Nurse Manager: ___x__present   Student RNs: _x____present   Medical Students: _____present   Art Therapist: _x____present   Clinical Coordinator: __x___present   Internal : __x__present   Occupational Therapist: __x___present   : __x___ present    Plan of care discussed and updated as appropriate.

## 2018-02-28 NOTE — BH NOTES
GROUP THERAPY PROGRESS NOTE    Martha Bear is participating in [place]. Group time: 30 minutes    Personal goal for participation: rules/ regulations    Goal orientation: community    Group therapy participation: pt refused    Therapeutic interventions reviewed and discussed: Pt refused group with much encouragement by staff.     Impression of participation: pt refused

## 2018-02-28 NOTE — BSMART NOTE
Pt. Is a 28year old male with history of Schizophrenia in addition to Autism. Pt was admitted to this facility for paranoia, hallucinations and aggression. Pt.'s case was discussed in treatment team this a.m.       RADHA Contact:  SW met with pt to discuss d/c planning. The pt. Expressed he was feeling better. Pt is denying ideations to harm self. Pt  stated he always hear voices throughout the day. Pt.was encouraged to continue to take medications. Pt. Stated he will. SW provided pt with mental health education. Pt. Was informed he will step-down to the [facility] program following this hospitalization. Pt. is currently denying ideations but continues to have hallucinations. Pt. Was cooperative, alert and has less thought blocking. RADHA Collateral:  SW and pt met with Mrs. Otero S   JULIAN. CM informed pt about stepping down to REACH.  Pt is now agreeing to step down to the program.

## 2018-03-01 PROCEDURE — 65220000005 HC RM SEMIPRIVATE PSYCH 3 OR 4 BED

## 2018-03-01 PROCEDURE — 74011250637 HC RX REV CODE- 250/637: Performed by: PSYCHIATRY & NEUROLOGY

## 2018-03-01 RX ADMIN — RISPERIDONE 3 MG: 1 TABLET, ORALLY DISINTEGRATING ORAL at 20:56

## 2018-03-01 RX ADMIN — RISPERIDONE 3 MG: 1 TABLET, ORALLY DISINTEGRATING ORAL at 08:18

## 2018-03-01 NOTE — BH NOTES
BRIT Note: The above pt has been pleasant upon approach this shift. He has not been a management problem this shift with staff and peers. He contract for safety and denies any self-harm this shift. He has been quiet majority for he shift. He verbally contract for safety and denies any self-harm at this time-a-Problem #1 cont. -P-Maintain a safe and supportive environment.

## 2018-03-01 NOTE — PROGRESS NOTES
NUTRITION    Nutrition Screen    RECOMMENDATIONS / PLAN:     - No nutrition intervention indicated at this time. Will re-screen as appropriate. NUTRITION DIAGNOSIS & INTERVENTIONS:     Nutrition Diagnosis:  No nutrition diagnosis at this time. ASSESSMENT:      Pt reports good appetite and meal consumption currently along with PTA. Tolerating diet. Average intake adequate to meet patients estimated nutritional needs:   [x] Yes     [] No      [] Unable to determine at this time    Diet: DIET REGULAR    Food Allergies: NKFA  Current Appetite:   [x] Good     [] Fair     [] Poor     [] Other:  Appetite/meal intake prior to admission:   [x] Good     [] Fair     [] Poor     [] Other:   Feeding Limitations:  [] Swallowing Difficulty       [] Chewing Difficulty       [] Other   Current Meal Intake: No data found. Gastrointestinal Issues:  [] Yes    [x] No   Skin Integrity:  WDL    Pertinent Medications:  Reviewed   Labs:  Reviewed     Anthropometrics:  Ht Readings from Last 1 Encounters:   02/27/18 5' 10.98\" (1.803 m)       Last 3 Recorded Weights in this Encounter    02/25/18 0842   Weight: 73.9 kg (163 lb)       Body mass index is 22.73 kg/(m^2).       Weight History:   Weight Metrics 2/25/2018 6/5/2015 5/20/2014 1/27/2014   Weight 163 lb 163 lb 9.6 oz 175 lb 3.2 oz 190 lb   BMI 22.73 kg/m2 22.83 kg/m2 24.45 kg/m2 26.51 kg/m2       Admitting Diagnosis: Schizophrenia  Schizophrenia ([facility]  Past Medical History:   Diagnosis Date    Paranoid schizophrenia ([facility]     Psychiatric disorder     Schizophrenia        Education Needs:        [x] None identified  [] Identified - Not appropriate at this time  []  Identified and addressed - refer to education log  Learning Limitations:   [x] None identified  [] Identified    Cultural, Taoist & ethnic food preferences identified:  [x] None    [] Yes      ESTIMATED NUTRITION NEEDS:     9246-3496 kcal (MSJx1.2-1.3), 62-78 gm protein (0.8-1 gm/kg), 1 mL/kcal  Based on: 78 kg       [] Actual BW      [x] IBW          MONITORING & EVALUATION:     Nutrition Goal(s):   1. Po intake of meals will meet >75% of patient estimated nutritional needs within the next 7 days.   Outcome:   [] Met    []  Not Met   [x] New/Initial Goal    Monitor:  [x] Food and beverage intake   [x] Diet order   [x] Nutrition-focused physical findings   [] Weight      Previous Recommendations (for follow-up assessments only):     []   Implemented       []   Not Implemented (RD to address)   [] No Longer Appropriate   [] No Recommendation Made       Discharge Planning: regular diet   [x]  Participated in care planning, discharge planning, & interdisciplinary rounds as appropriate      Tahir Woo RD   Pager: [phone]

## 2018-03-01 NOTE — BSMART NOTE
SOCIAL WORK GROUP THERAPY PROGRESS NOTE    Group Time:  10:15am    Group Topic:  Coping Skills    Group Participation:     Pt expressed not interested in attending session despite staff support

## 2018-03-01 NOTE — BH NOTES
GROUP THERAPY PROGRESS NOTE    Doriscruz WEIR ZamoranoBenitoFaheem is not  participating in [place]. Group time: 30 minutes    Personal goal for participation: rules/ regulations    Goal orientation: community    Group therapy participation: pt refused    Therapeutic interventions reviewed and discussed: Pt refused group with much encouragement by staff.     Impression of participation: pt refused

## 2018-03-01 NOTE — PROGRESS NOTES
[address]  Inpatient Progress Note     Date of Service: 03/01/18  Hospital Day: 7     Subjective/Interval History   03/01/18    Treatment Team Notes:  Notes reviewed and/or discussed and report that Lilli Villanueva demonstrated no interval concerns. Medication compliant. Attending some groups. Patient interview: Lilli Villanueva was interviewed by this writer today. +command AH telling the pt that people are out to harm him. Pt states hallucinations are present at baseline and he can always understand them. States hallucinations are decreasing. Feels medications are helpful. Sleep and appetite are adequate. Pt is medication compliant and denies medication side effects including TD, EPS, akathisia and mood derangements. Denies SI, HI and VH. Objective     Vitals:    02/27/18 0755 02/27/18 1405 02/27/18 2028 02/28/18 2027   BP: 109/81  112/84 114/82   Pulse: 90  88 86   Resp: 14  18 14   Temp: 96.7 °F (35.9 °C)  98.2 °F (36.8 °C) 98.4 °F (36.9 °C)   Weight:       Height:  5' 10.98\" (1.803 m)         Mental Status Examination     Appearance/Hygiene 28 y.o. AAM with good hygiene   Behavior/Social Relatedness Cooperative, bizarre relatedness but this appears to be improving. Musculoskeletal Gait/Station: appropriate  Tone (flaccid, cogwheeling, spastic): not assessed  Psychomotor (hyperkinetic, hypokinetic): calm, cooperative. Involuntary movements (tics, dyskinesias, akathisia, stereotypies): none   Speech                          Perseverative   Mood                          Calm    Affect                                                   Calm   Thought Process Distracted, +thought blocking but improving. perseverative re: [facility]. Thought Content and Perceptual Disturbances +CAH  Denies VH. Denies SI and HI.       Sensorium and Cognition              Grossly intact   Insight              poor   Judgment poor     Assessment/Plan      Psychiatric Diagnoses:   Patient Active Problem List   Diagnosis Code    Schizophrenia ([facility] [address] F20.9     Medical Diagnoses: None identified     Psychosocial and contextual factors:                         1.  Medication and treatment non-compliance.      Level of impairment/disability: Moderate    Lilli Villanueva is a 28 y.o. who is currently improving. Pt admits to [facility] today. Denies SI, HI and VH. 1.  Schizophrenia   - continue risperidone M-tab 3mg po BID  2. Continue Vistaril 25mg TID/prn for anxiety. 3.  Reviewed instructions, risks, benefits and side effects of medications  4.   Disposition/Discharge Date: self-care/home, 3-2-2018    Hal Hanna MD DR. [facility]  Psychiatry

## 2018-03-01 NOTE — BH NOTES
Patient spent most of his day in bed resting, he did niot display any negative or aggressive behavior. He ate all meals took his medication. staff will continue to monitor.

## 2018-03-02 LAB
ATRIAL RATE: 68 BPM
CALCULATED P AXIS, ECG09: 74 DEGREES
CALCULATED R AXIS, ECG10: 75 DEGREES
CALCULATED T AXIS, ECG11: 53 DEGREES
DIAGNOSIS, 93000: NORMAL
P-R INTERVAL, ECG05: 194 MS
Q-T INTERVAL, ECG07: 368 MS
QRS DURATION, ECG06: 98 MS
QTC CALCULATION (BEZET), ECG08: 391 MS
VENTRICULAR RATE, ECG03: 68 BPM

## 2018-03-02 PROCEDURE — 74011250637 HC RX REV CODE- 250/637: Performed by: PSYCHIATRY & NEUROLOGY

## 2018-03-02 PROCEDURE — 74011250637 HC RX REV CODE- 250/637: Performed by: STUDENT IN AN ORGANIZED HEALTH CARE EDUCATION/TRAINING PROGRAM

## 2018-03-02 PROCEDURE — 65220000005 HC RM SEMIPRIVATE PSYCH 3 OR 4 BED

## 2018-03-02 PROCEDURE — 93005 ELECTROCARDIOGRAM TRACING: CPT

## 2018-03-02 RX ORDER — RISPERIDONE 2 MG/1
4 TABLET, FILM COATED ORAL 2 TIMES DAILY
Status: DISCONTINUED | OUTPATIENT
Start: 2018-03-03 | End: 2018-03-05 | Stop reason: HOSPADM

## 2018-03-02 RX ORDER — RISPERIDONE 1 MG/1
3 TABLET, ORALLY DISINTEGRATING ORAL 2 TIMES DAILY
Status: COMPLETED | OUTPATIENT
Start: 2018-03-02 | End: 2018-03-02

## 2018-03-02 RX ADMIN — RISPERIDONE 3 MG: 1 TABLET, ORALLY DISINTEGRATING ORAL at 20:35

## 2018-03-02 RX ADMIN — RISPERIDONE 3 MG: 1 TABLET, ORALLY DISINTEGRATING ORAL at 08:28

## 2018-03-02 RX ADMIN — TRAZODONE HYDROCHLORIDE 50 MG: 50 TABLET ORAL at 20:35

## 2018-03-02 NOTE — PROGRESS NOTES
Problem: Psychosis  Goal: *STG: Decreased hallucinations  Will have no or less hallucinations every shift during his hospitalization. Outcome: Progressing Towards Goal  Patient denies auditory hallucinations. Goal: *STG: Remains safe in hospital  Will remain safe, will demonstrate safe behaviors every shift throughout his hospital stay. Outcome: Progressing Towards Goal  Patient has remained free of harm to self and others while in facility. Goal: *STG/LTG: Complies with medication therapy  Will be compliant with taking his medications as ordered daily during his hospital stay. Outcome: Progressing Towards Goal  Patient has been compliant with prescribed medication. Comments: Patient has been observed pacing throughout milieu at times stating \"I'm supposed to be going home soon, do you know when\". It was reported to writer that patient discharge is pending with  putting discharge goals in place. Patient has been calm and cooperative upon approach however is hesitant and confused when expressing him self to staff. Patient has been compliant with prescribed medications. Patient denies auditory hallucinations, denies suicidal ideation with no safety issue noted, Will monitor for safety with support as needed throughout treatment regimen.

## 2018-03-02 NOTE — PROGRESS NOTES
Problem: Psychosis  Goal: *STG: Decreased hallucinations  Will have no or less hallucinations every shift during his hospitalization. Outcome: Progressing Towards Goal  Patient stated that the voices are calmer; sees a man in a suit. Goal: *STG: Remains safe in hospital  Will remain safe, will demonstrate safe behaviors every shift throughout his hospital stay. Outcome: Progressing Towards Goal  Patient contracts for safety; denied thoughts of harm to self or others. Goal: *STG: Participates in individual and group therapy  Will attend/ participate in at least 2-3 groups daily during his hospitalization. Outcome: Not Progressing Towards Goal  Patient did not attend group this evening. Goal: *STG/LTG: Complies with medication therapy  Will be compliant with taking his medications as ordered daily during his hospital stay. Outcome: Progressing Towards Goal  Patient is medication compliant. Problem: Falls - Risk of  Goal: *Absence of Falls  Document Paty Fall Risk and appropriate interventions in the flowsheet. Will remain free from falls daily during his hospital stay. Outcome: Progressing Towards Goal  Fall Risk Interventions:        Medication Interventions: Teach patient to arise slowly      Patient remains free from harm; gripper socks intact throughout shift; verbalized understanding for using caution with position changes. Patient is alert and oriented x 4, pleasant upon approach, flat affect, \"pretty good. ..but a little sad at times\" mood. Patient stated that he feels better. ..voices are \"calmer, but I see a man in a suit. ..trying to get me. \" Patient stated that he feels like someone is out to get him at times. Reality checks offered as needed. On rounds, patient also seen talking with self in the bathroom mirror. Patient voiced that he completed hygiene care this morning; \"good\" appetite and sleep; will monitor and maintain safe environment.

## 2018-03-02 NOTE — PROGRESS NOTES
[address]  Inpatient Progress Note     Date of Service: 03/02/18  Hospital Day: 8     Subjective/Interval History   03/02/18    Treatment Team Notes:  Notes reviewed and/or discussed and report that Lilli Villanueva demonstrated no interval concerns. Attending few groups. States voices are more calm but sees a man in a suit trying to harm him.  +Paranoia. Patient interview: Lilli Villanueva was interviewed by this writer today. +command AH telling the pt that people are out to harm him. +VH of man in a suit trying to harm him. Pt states he would not harm himself or others due to hallucinations. Pt is medication compliant and denies medication side effects including TD, EPS, akathisia and mood derangements. Denies SI & HI.      Objective     Vitals:    02/27/18 2028 02/28/18 2027 03/01/18 0845 03/01/18 1928   BP: 112/84 114/82 114/76 132/77   Pulse: 88 86 86 95   Resp: 18 14 18 20   Temp: 98.2 °F (36.8 °C) 98.4 °F (36.9 °C) 98.6 °F (37 °C) 98.6 °F (37 °C)   Weight:       Height:           Mental Status Examination     Appearance/Hygiene 28 y.o. AAM with good hygiene   Behavior/Social Relatedness Cooperative/shy, reluctant to engage at times. Musculoskeletal Gait/Station: appropriate  Tone (flaccid, cogwheeling, spastic): not assessed  Psychomotor (hyperkinetic, hypokinetic): calm, cooperative. Involuntary movements (tics, dyskinesias, akathisia, stereotypies): none   Speech                          Mumbled but concise   Mood                          Calm    Affect                                                   Calm   Thought Process Distracted, +thought blocking but improving. Appears distracted at times. Thought Content and Perceptual Disturbances +CAH  +VH    Denies SI and HI.       Sensorium and Cognition              Grossly intact   Insight              poor   Judgment poor         Assessment/Plan      Psychiatric Diagnoses:   Patient Active Problem List   Diagnosis Code    Schizophrenia ([facility].) F20.9     Medical Diagnoses: None identified     Psychosocial and contextual factors:                         1.  Medication and treatment non-compliance.      Level of impairment/disability: Moderate    Lilli Villanueva is a 28 y.o. who is currently improving. Pt admits to [address] today. Denies SI, HI and VH. 1.  Schizophrenia   - continue risperidone M-tab 3mg po BID today then increase to 4mg po BID thereafter. 2.  Continue Vistaril 25mg TID/prn for anxiety. 3.  Reviewed instructions, risks, benefits and side effects of medications  4.   Will check EKG today and repeat labs on 3-4-2018.   5.  Disposition/Discharge Date: self-care/home, 3-5/6-2018    Keyana French MD [facility]. [facility]  Psychiatry

## 2018-03-02 NOTE — BH NOTES
GROUP THERAPY PROGRESS NOTE    Marion Liu is participating in [place].      Group time: 30 minutes    Personal goal for participation: discuss guideline compliance, unit issues and community announcements; discuss post discharge goals and Tx    Goal orientation: community    Group therapy participation: passive

## 2018-03-02 NOTE — BSMART NOTE
Pt. Is a 28year old male with history of Schizophrenia in addition to Autism. Pt was admitted to this facility for paranoia, hallucinations and aggression. Pt.'s case with the treating psychiatrist.    RADHA Collateral:  SW received a donte from Mrs. Ebenezer Clement. Mrs. Sinai Haines stated the pt is unable to step-down to [facility] until she receives , pt.'s psychological from past outpt mental health provider. RADHA informed the pt and treating psychiatrist. Pt. Will be stepped down to the facility , at a later time. RADHA Contact:  RADHA met with pt to discuss d/c planning. Pt. admits he continues to hear voices and see a man , but know they are not real.  Pt continues to deny self harm. de  Pt. Was informed he will step-down to the [facility] program following this hospitalization. Pt. Is alert, calm and cooperative. Pt. Lacks insight.

## 2018-03-03 PROCEDURE — 74011250637 HC RX REV CODE- 250/637: Performed by: STUDENT IN AN ORGANIZED HEALTH CARE EDUCATION/TRAINING PROGRAM

## 2018-03-03 PROCEDURE — 65220000005 HC RM SEMIPRIVATE PSYCH 3 OR 4 BED

## 2018-03-03 PROCEDURE — 74011250637 HC RX REV CODE- 250/637: Performed by: PSYCHIATRY & NEUROLOGY

## 2018-03-03 RX ADMIN — TRAZODONE HYDROCHLORIDE 50 MG: 50 TABLET ORAL at 20:38

## 2018-03-03 RX ADMIN — RISPERIDONE 4 MG: 2 TABLET ORAL at 08:32

## 2018-03-03 RX ADMIN — RISPERIDONE 4 MG: 2 TABLET ORAL at 20:37

## 2018-03-03 NOTE — BH NOTES
Patient has been cooperative. He denies suicidal/homicidal ideations. He didn't attend group. He stated he was here because \"I had to get my medication right\". He didn't have visitors or phone calls. He ate dinner, snack and medication compliant. Nursing will continue to provide a safe and supportive environment.

## 2018-03-03 NOTE — BH NOTES
The patient was discussed with nursing staff, was seen during rounds and his chart was reviewed. He reports eating and sleeping well. He states his medicine is \"working great. \"  He further states his medications help with AH and VH. He states he last experienced VH on yesterday. MSE-  30yo AAM.  Rosalba Painter stated age. Dressed appropriately. Cooperative. Speech is normal in rate, volume, and tone. No AIMs. Stated mood is, \"Really good mood. \"  Affect is mildly constricted. Thoughts are goal-directed and succinct. Denies SI and HI. Denies AH and VH. Insight and judgment are difficult to gauge. A/P-Schizophrenia  1. Continue Risperdal 4mg PO BID. 2. Continue hospitalization.

## 2018-03-03 NOTE — BH NOTES
GROUP THERAPY PROGRESS NOTE    Doriscruz WEIR ShakaFaheem is participating in Recreational Therapy. Group time: 30 minutes    Personal goal for participation: Fresh Air Break/Relaxation    Goal orientation: relaxation    Group therapy participation: active    Therapeutic interventions reviewed and discussed: Fresh Air Break/Relaxation    Impression of participation: Pt played basketball/corn hole with peers/this writer. Pt appeared to enjoy himself; observed laughing and smiling. Pt challenged this writer to another game if I return to the unit before he discharges.

## 2018-03-03 NOTE — PROGRESS NOTES
Problem: Psychosis  Goal: *STG: Decreased hallucinations  Will have no or less hallucinations every shift during his hospitalization. Outcome: Progressing Towards Goal  Currently denies any auditory/visual hallucinations. Goal: *STG: Decreased delusional thinking  Will have no or decreased delusional thinking daily during his hospital stay. Outcome: Progressing Towards Goal  No delusional thinking noted this shift. Goal: *STG: Remains safe in hospital  Will remain safe, will demonstrate safe behaviors every shift throughout his hospital stay. Outcome: Progressing Towards Goal  No unsafe behavior noted. Goal: *STG/LTG: Complies with medication therapy  Will be compliant with taking his medications as ordered daily during his hospital stay. Outcome: Progressing Towards Goal  Pt compliant with medications    Problem: Falls - Risk of  Goal: *Absence of Falls  Document Paty Fall Risk and appropriate interventions in the flowsheet. Will remain free from falls daily during his hospital stay. Outcome: Progressing Towards Goal  Fall Risk Interventions:            Medication Interventions: Teach patient to arise slowly          Pt wearing non skid slippers to prevent falls. Comments: Pt spending minimal time on unit. Pt out of room for meals. Pt currently denies any thoughts of self harm or harm to others. Pt denies any auditory/visual hallucinations. No delusional thinking noted this shift. Pt compliant with medications. Encouraged pt to come out into the milieu. Pt stating he just wants to rest. Staff will continue to monitor pt for safety and provide a supportive and therapeutic environment.

## 2018-03-04 LAB
ALBUMIN SERPL-MCNC: 3.7 G/DL (ref 3.4–5)
ALBUMIN/GLOB SERPL: 1.2 {RATIO} (ref 0.8–1.7)
ALP SERPL-CCNC: 40 U/L (ref 45–117)
ALT SERPL-CCNC: 12 U/L (ref 16–61)
ANION GAP SERPL CALC-SCNC: 6 MMOL/L (ref 3–18)
AST SERPL-CCNC: 10 U/L (ref 15–37)
BILIRUB SERPL-MCNC: 0.6 MG/DL (ref 0.2–1)
BUN SERPL-MCNC: 13 MG/DL (ref 7–18)
BUN/CREAT SERPL: 12 (ref 12–20)
CALCIUM SERPL-MCNC: 9.1 MG/DL (ref 8.5–10.1)
CHLORIDE SERPL-SCNC: 105 MMOL/L (ref 100–108)
CO2 SERPL-SCNC: 30 MMOL/L (ref 21–32)
CREAT SERPL-MCNC: 1.11 MG/DL (ref 0.6–1.3)
GLOBULIN SER CALC-MCNC: 3.2 G/DL (ref 2–4)
GLUCOSE SERPL-MCNC: 80 MG/DL (ref 74–99)
POTASSIUM SERPL-SCNC: 4.2 MMOL/L (ref 3.5–5.5)
PROT SERPL-MCNC: 6.9 G/DL (ref 6.4–8.2)
SODIUM SERPL-SCNC: 141 MMOL/L (ref 136–145)

## 2018-03-04 PROCEDURE — 65220000005 HC RM SEMIPRIVATE PSYCH 3 OR 4 BED

## 2018-03-04 PROCEDURE — 80053 COMPREHEN METABOLIC PANEL: CPT | Performed by: PSYCHIATRY & NEUROLOGY

## 2018-03-04 PROCEDURE — 36415 COLL VENOUS BLD VENIPUNCTURE: CPT | Performed by: PSYCHIATRY & NEUROLOGY

## 2018-03-04 PROCEDURE — 74011250637 HC RX REV CODE- 250/637: Performed by: STUDENT IN AN ORGANIZED HEALTH CARE EDUCATION/TRAINING PROGRAM

## 2018-03-04 PROCEDURE — 74011250637 HC RX REV CODE- 250/637: Performed by: PSYCHIATRY & NEUROLOGY

## 2018-03-04 RX ADMIN — RISPERIDONE 4 MG: 2 TABLET ORAL at 20:07

## 2018-03-04 RX ADMIN — TRAZODONE HYDROCHLORIDE 50 MG: 50 TABLET ORAL at 20:07

## 2018-03-04 RX ADMIN — RISPERIDONE 4 MG: 2 TABLET ORAL at 08:27

## 2018-03-04 NOTE — BH NOTES
GROUP THERAPY PROGRESS NOTE    Lilli Villanueva is participating in Medication Management, Discharge and TDO education group. Group time:   30 minutes    Personal goal for participation: Understanding discharge and TDO process, Medication Management    Goal orientation: community    Group therapy participation: active    Therapeutic interventions reviewed and discussed: Understanding the discharge process (TDO), Medication management (compliance and knowing your medication). Impression of participation: Active and involved with group.

## 2018-03-04 NOTE — BH NOTES
The patient was discussed with nursing staff, was seen during rounds and his chart was reviewed. The patient was retrieved from his bed before the interview. He denies excessive sedation and sleeps well at night. He reports good appetite. He states she last experienced AH ~1.5 days ago.       MSE-  32yo AAM.  Ragena Bence stated age. Dressed appropriately. Cooperative. Speech is normal in rate, volume, and tone. No AIMs. Stated mood is, \"Happy mood. \"  Affect is full and pleasant. Thoughts are goal-directed and succinct. Denies SI and HI. Denies AH and VH. Insight appears limited while judgment is fair.      A/P-Schizophrenia  1. Continue Risperdal 4mg PO BID. 2. Continue hospitalization.

## 2018-03-04 NOTE — BH NOTES
GROUP THERAPY PROGRESS NOTE    Chel Varghese is participating in Nursing Education group (housing). Group time:  30 minutes    Personal goal for participation: Understanding the need for shelter other than [employer]. Goal orientation: Personal growth    Group therapy participation: Individual    Therapeutic interventions reviewed and discussed: Remaining stable and compliant and it's relationship to being stable and having safe shelter. Impression of participation: Active in discussion, not receptive to understanding why returning to the [facility]. Working with SW is optimum.

## 2018-03-04 NOTE — BH NOTES
GROUP THERAPY PROGRESS NOTE    Lilli Villanueva was encouraged to participate in Community Group and declined.

## 2018-03-04 NOTE — BH NOTES
Pt has been pleasant throughout the shift; often observed smiling. \"My day has been good, I'm just waiting for somewhere to live\". Pt is quiet but participates in activities on the unit with peers. Pt has been in the day area most of the shift and attended group this evening. Pt is only verbal with staff when spoken to; answers questions but does not elaborate on his answers. Pt was active during Recreational group; appeared to enjoy himself. Pt did not have visitors this shift, but was observed on the phone, having a pleasant conversation. Pt consumed 100% of his dinner meal/snack. Pt denies SI, HI, AH, and VH at this time;contracts for safety. Will continue to monitor.

## 2018-03-04 NOTE — PROGRESS NOTES
Problem: Psychosis  Goal: *STG: Decreased hallucinations  Will have no or less hallucinations every shift during his hospitalization. Outcome: Progressing Towards Goal  Patient denies auditory hallucinations. Goal: *STG: Remains safe in hospital  Will remain safe, will demonstrate safe behaviors every shift throughout his hospital stay. Outcome: Progressing Towards Goal  Patient hs remained free of harm to self and others while in facility. Goal: *STG/LTG: Complies with medication therapy  Will be compliant with taking his medications as ordered daily during his hospital stay. Outcome: Progressing Towards Goal  Patient hs been compliant with prescribed medications. Comments: Patient has been visible within milieu and has been calm and cooperative upon approach. Patient is hesitant at times when talking to writer while rubbing head and smiling. Patient has not presented as a management issue son unit. Patient denies suicidal ideation, denies auditory hallucinations. Will continue to monitor for safety with support as needed throughout treatment regimen.

## 2018-03-05 VITALS
WEIGHT: 163 LBS | HEIGHT: 71 IN | HEART RATE: 76 BPM | DIASTOLIC BLOOD PRESSURE: 69 MMHG | BODY MASS INDEX: 22.82 KG/M2 | TEMPERATURE: 98.5 F | SYSTOLIC BLOOD PRESSURE: 110 MMHG | RESPIRATION RATE: 18 BRPM

## 2018-03-05 PROCEDURE — 74011250637 HC RX REV CODE- 250/637: Performed by: PSYCHIATRY & NEUROLOGY

## 2018-03-05 RX ORDER — TRAZODONE HYDROCHLORIDE 50 MG/1
50 TABLET ORAL
Qty: 30 TAB | Refills: 0 | Status: SHIPPED | OUTPATIENT
Start: 2018-03-05

## 2018-03-05 RX ORDER — RISPERIDONE 4 MG/1
4 TABLET, FILM COATED ORAL 2 TIMES DAILY
Qty: 60 TAB | Refills: 0 | Status: SHIPPED | OUTPATIENT
Start: 2018-03-05

## 2018-03-05 RX ADMIN — RISPERIDONE 4 MG: 2 TABLET ORAL at 08:28

## 2018-03-05 NOTE — DISCHARGE SUMMARY
[facility]  Inpatient Psychiatry   Discharge Summary     Admit date: 2/22/2018    Discharge date and time: 3/5/2018  2:00 PM    Discharge Physician: Jose Luis Felix MD    DISCHARGE DIAGNOSES     Psychiatric Diagnoses:        Patient Active Problem List   Diagnosis Code    Schizophrenia [facility]) F20.9      Medical Diagnoses: None identified      Psychosocial and contextual factors:                         3.  Medication and treatment non-compliance. [place] presented to the inpatient unit for inpatient psychiatric hospitalization after developing increasing bizarre behaviors in the context of medication non-compliance. The pt feels \"the milligrams\" of his medicine is \"too high. \"  He reports wants to take a medication with the dosage in the \"single digits. \"  He does admit to seeing people from high school on his shoes which he noted, \"Was weird. \"   The pt was willing to take a medication which could be dosed in the single digits. To maximize his medication with the pt's request, he was started on Risperidone which was increased to 4mg po BID. He tolerated this medication well without side effects. At 8mg total daily dose, AVH completely resolved. The pt was not a behavioral concern while hospitalized. he attended some groups, was medication compliant and behaviorally appropriate. Pt denied medication side effects including TD, EPS, akathisia and mood derangements. On  the pt was deemed psychiatrically stable and discharged to home. The pt denied SI, HI and AVH. DISPOSITION/FOLLOW-UP     Disposition: self-care    Follow-up Appointments:  Pt. Has an appointment with Dr. Kacey Hayes for 3/21/18 @ 10:00 a.m. [address], [facility] [address] Phone: [phone]. MEDICATION CHANGES   Outpatient medications:  No current facility-administered medications on file prior to encounter.       Current Outpatient Prescriptions on File Prior to Encounter   Medication Sig Dispense Refill    haloperidol (HALDOL) 10 mg tablet Take 10 mg by mouth nightly. Indications: SCHIZOPHRENIA      citalopram (CELEXA) 20 mg tablet Take 20 mg by mouth daily.  benztropine (COGENTIN) 1 mg tablet Take 1 mg by mouth daily. Medications discontinued during hospitalization:  Medications Discontinued During This Encounter   Medication Reason    risperiDONE (RisperDAL m-tabs) disintegrating tablet 0.5 mg     risperiDONE (RisperDAL m-tabs) disintegrating tablet 1 mg     risperiDONE (RisperDAL m-tabs) disintegrating tablet 2 mg     risperiDONE (RisperDAL m-tabs) disintegrating tablet 3 mg          Discharged medication:  Current Discharge Medication List      START taking these medications    Details   risperiDONE (RISPERDAL) 4 mg tablet Take 1 Tab by mouth two (2) times a day. Indications: Schizophrenia  Qty: 60 Tab, Refills: 0      traZODone (DESYREL) 50 mg tablet Take 1 Tab by mouth nightly as needed for Sleep. Indications: Insomnia  Qty: 30 Tab, Refills: 0         STOP taking these medications       haloperidol (HALDOL) 10 mg tablet Comments:   Reason for Stopping:         citalopram (CELEXA) 20 mg tablet Comments:   Reason for Stopping:         benztropine (COGENTIN) 1 mg tablet Comments:   Reason for Stopping:               Instructions, risks (black box warning), benefits and side effects (EPS, TD, NMS) were discussed in detail prior to discharge. Patient denied any adverse medication side effects prior to discharge.        LABS/IMAGING DURING ADMISSION     Results for orders placed or performed during the hospital encounter of 02/22/18   CBC WITH AUTOMATED DIFF   Result Value Ref Range    WBC 3.0 (L) 4.6 - 13.2 K/uL    RBC 4.98 4.70 - 5.50 M/uL    HGB 14.2 13.0 - 16.0 g/dL    HCT 43.0 36.0 - 48.0 %    MCV 86.3 74.0 - 97.0 FL    MCH 28.5 24.0 - 34.0 PG    MCHC 33.0 31.0 - 37.0 g/dL    RDW 13.6 11.6 - 14.5 % PLATELET 030 235 - 654 K/uL    MPV 10.1 9.2 - 11.8 FL    NEUTROPHILS 50 40 - 73 %    LYMPHOCYTES 41 21 - 52 %    MONOCYTES 6 3 - 10 %    EOSINOPHILS 2 0 - 5 %    BASOPHILS 1 0 - 2 %    ABS. NEUTROPHILS 1.5 (L) 1.8 - 8.0 K/UL    ABS. LYMPHOCYTES 1.2 0.9 - 3.6 K/UL    ABS. MONOCYTES 0.2 0.05 - 1.2 K/UL    ABS. EOSINOPHILS 0.1 0.0 - 0.4 K/UL    ABS. BASOPHILS 0.0 0.0 - 0.1 K/UL    DF AUTOMATED     METABOLIC PANEL, COMPREHENSIVE   Result Value Ref Range    Sodium 140 136 - 145 mmol/L    Potassium 4.4 3.5 - 5.5 mmol/L    Chloride 107 100 - 108 mmol/L    CO2 27 21 - 32 mmol/L    Anion gap 6 3.0 - 18 mmol/L    Glucose 85 74 - 99 mg/dL    BUN 14 7.0 - 18 MG/DL    Creatinine 0.92 0.6 - 1.3 MG/DL    BUN/Creatinine ratio 15 12 - 20      GFR est AA >60 >60 ml/min/1.73m2    GFR est non-AA >60 >60 ml/min/1.73m2    Calcium 9.2 8.5 - 10.1 MG/DL    Bilirubin, total 0.4 0.2 - 1.0 MG/DL    ALT (SGPT) 11 (L) 16 - 61 U/L    AST (SGOT) 12 (L) 15 - 37 U/L    Alk.  phosphatase 39 (L) 45 - 117 U/L    Protein, total 7.2 6.4 - 8.2 g/dL    Albumin 3.7 3.4 - 5.0 g/dL    Globulin 3.5 2.0 - 4.0 g/dL    A-G Ratio 1.1 0.8 - 1.7     TSH 3RD GENERATION   Result Value Ref Range    TSH 1.29 0.36 - 3.74 uIU/mL   LIPID PANEL   Result Value Ref Range    LIPID PROFILE          Cholesterol, total 114 <200 MG/DL    Triglyceride 73 <150 MG/DL    HDL Cholesterol 37 (L) 40 - 60 MG/DL    LDL, calculated 62.4 0 - 100 MG/DL    VLDL, calculated 14.6 MG/DL    CHOL/HDL Ratio 3.1 0 - 5.0     HEMOGLOBIN A1C WITH EAG   Result Value Ref Range    Hemoglobin A1c 5.3 4.2 - 5.6 %    Est. average glucose 025 mg/dL   METABOLIC PANEL, COMPREHENSIVE   Result Value Ref Range    Sodium 141 136 - 145 mmol/L    Potassium 4.2 3.5 - 5.5 mmol/L    Chloride 105 100 - 108 mmol/L    CO2 30 21 - 32 mmol/L    Anion gap 6 3.0 - 18 mmol/L    Glucose 80 74 - 99 mg/dL    BUN 13 7.0 - 18 MG/DL    Creatinine 1.11 0.6 - 1.3 MG/DL    BUN/Creatinine ratio 12 12 - 20      GFR est AA >60 >60 ml/min/1.73m2    GFR est non-AA >60 >60 ml/min/1.73m2    Calcium 9.1 8.5 - 10.1 MG/DL    Bilirubin, total 0.6 0.2 - 1.0 MG/DL    ALT (SGPT) 12 (L) 16 - 61 U/L    AST (SGOT) 10 (L) 15 - 37 U/L    Alk. phosphatase 40 (L) 45 - 117 U/L    Protein, total 6.9 6.4 - 8.2 g/dL    Albumin 3.7 3.4 - 5.0 g/dL    Globulin 3.2 2.0 - 4.0 g/dL    A-G Ratio 1.2 0.8 - 1.7     EKG, 12 LEAD, INITIAL   Result Value Ref Range    Ventricular Rate 68 BPM    Atrial Rate 68 BPM    P-R Interval 194 ms    QRS Duration 98 ms    Q-T Interval 368 ms    QTC Calculation (Bezet) 391 ms    Calculated P Axis 74 degrees    Calculated R Axis 75 degrees    Calculated T Axis 53 degrees    Diagnosis       Normal sinus rhythm  ST elevation, probably due to early repolarization  Borderline ECG  No previous ECGs available  Confirmed by Anahi Beaulieu MD, Corky Gross ([id number]) on 3/2/2018 2:14:40 PM          DISCHARGE MENTAL STATUS EVALUATION     Appearance/Hygiene 27 y. o. AAM with good hygiene   Behavior/Social Relatedness Cooperative/shy, reluctant to engage at times. Musculoskeletal Gait/Station: appropriate  Tone (flaccid, cogwheeling, spastic): not assessed  Psychomotor (hyperkinetic, hypokinetic): calm, cooperative. Involuntary movements (tics, dyskinesias, akathisia, stereotypies): none   Speech                          Mumbles a times but otherwise clear & concise. Mood                          Calm    Affect                                                   Calm   Thought Process [place]   Thought Content and Perceptual Disturbances Denies SI, HI & AVH. Sensorium and Cognition              Grossly intact   Insight              fair   Judgment fair         SUICIDE RISK ASSESSMENT     [] Admission  [x] Discharge     Key Factors:   Current admission precipitated by suicide attempt?   []  Yes     2    [x]  No     1     Suicide Attempt History  [] Past attempts of high lethality    2 []  Past attempts of low lethality    1 [x]  No previous attempts       0 Suicidal Ideation []  Constant suicidal thoughts      2 []  Intermittent or fleeting suicidal  thoughts  1 [x]  Denies current suicidal thoughts    0   Suicide Plan   []  Has plan with actual OR potential access to planned method    2 []  Has plan without access to planned method      1 [x]  No plan            0   Plan Lethality []  Highly lethal plan (Carbon monoxide, gun, hanging, jumping)    2 []  Moderate lethality of plan          1 [x]  Low lethality of plan (biting, head banging, superficial scratching, pillow over face)  0   Safety Plan Agreement  []  Unwilling OR unable to agree due to impaired reality testing   2   []  Patient is ambivalent and/or guarded      1 [x]  Reliably agrees        0   Current Morbid Thoughts (reunion fantasies, preoccupations with death) []  Constantly     2     []  Frequently    1 [x]  Rarely    0   Elopement Risk  []  High risk     2 []  Moderate risk    1 [x]   Low risk    0   Symptoms    []  Hopeless  []  Helpless  []  Anhedonia   []  Guilt/shame  []  Anger/rage  []  Anxiety  []  Insomnia   []  Agitation   []  Impulsivity  []  5-6 symptoms present    2 []  3-4 symptoms present    1  [x]  0-2 symptoms present    0     Scoring Key:  10 or higher = Imminent Risk (consider 1:1)  4 - 9 = Moderate Risk (consider q 15 minute observation)Attended alcohol, tobacco, prescription and other drug psychoeducation group.   0 - 3 = Low Risk (consider q 30 minute observation)    Total Score: 1  ------------------------------------------------------------------------------------------------------------------  PLEASE ADDRESS THE FOLLOWING 5 ISSUES     Physician's Subjective Appraisal of Risk (check one):  []  Patient replies not trustworthy: several non-verbal cues. []  Patient replies questionable: trustworthy: at least 1 non-verbal cue. [x]  Patient replies appear trustworthy. Family History of Suicide?    []  Yes  [x]  No    Protective measures (select all that apply):  [x]  Successful past responses to stress  [x]  Spiritual/Scientologist beliefs  [x]  Capacity for reality testing  [x]  Positive therapeutic relationships  [x]  Social supports/connections  [x]  Positive coping skills  [x]  Frustration tolerance/optimism  []  Children or pets in the home  []  Sense of responsibility to family  [x]  Agrees to treatment plan and follow up    Others (list):    High Risk Diagnoses (select all that apply):  []  Depression/Bipolar Disorder  []  Dual Diagnosis  []  Cardiovascular Disease  [x]  Schizophrenia  []  Chronic Pain  []  Epilepsy  []  Cancer  []  Personality Disorder  []  HIV/AIDS  []  Multiple Sclerosis    Dangerousness Assessment (Suicide, homicide, property destruction. ..)    Risk Factors reviewed and risk assessed to be:  [] low  [] low-moderate  [x] moderate   [] moderate-high  [] high     Protection factors reviewed and risk assessed to be:  [x] low  [] low-moderate  [] moderate   [] moderate-high  [] high     Response to treatment and risk assessed to be:  [x] low  [] low-moderate  [] moderate   [] moderate-high  [] high     Support reviewed and risk assessed to be:  [x] low  [] low-moderate  [] moderate   [] moderate-high  [] high     Acceptance of Discharge and outpatient treatment reviewed and risk assessed to be:    [x] low  [] low-moderate  [] moderate   [] moderate-high  [] high   Overall risk assessed to be:  [x] low  [x] low-moderate  [] moderate   [] moderate-high  [] high     Completion of discharge was greater than 30 minutes. Over 50% of today's discharge was geared towards counseling and coordination of care.           Lyndon Evans MD  Psychiatry  [facility]

## 2018-03-05 NOTE — BSMART NOTE
GROUP THERAPY PROGRESS NOTE    Lilli Villanueva was encouraged by staff but refused to participate in Community group.

## 2018-03-05 NOTE — DISCHARGE INSTRUCTIONS
BEHAVIORAL HEALTH NURSING DISCHARGE NOTE      The following personal items collected during your admission are returned to you:   Dental Appliance: Dental Appliances: None  Vision: Visual Aid: None  Hearing Aid:    Jewelry: Jewelry: None  Clothing: Clothing: Footwear, Jacket/Coat, Pants, Shirt, Undergarments  Other Valuables: Other Valuables: Cell Phone, [phone] sent to safe:        PATIENT INSTRUCTION:      Regular diet      The discharge information has been reviewed with the patient. The patient verbalized understanding.     Patient armband removed and shredded

## 2018-03-05 NOTE — BH NOTES
Pt visible in day area approximately 50% of shift. Pt did not seem interested in watching movies with peers, and would walk between room and day area. Pt washed hands several times during the shift and took a shower early in the sift. Pt had minimal interaction with peers and did not require staff redirection, was compliant with all staff direction. Pt did attend groups and affect was bright.

## 2018-03-05 NOTE — BSMART NOTE
Pt. Is a 28year old male with history of Schizophrenia in addition to Autism. Pt was admitted to this facility for paranoia, hallucinations and aggression.      RADHA Collaterals:  RADHA contacted [facility] staff at @ [phone] to discuss d/c today. CM was not available. SW left a message. RADHA Contact:  RADHA talked to pt this a.m to discuss d/c plan. RADHA explained to pt. , the team was waiting to here back from the [facility] program about pt stepping down to the facility. The pt. Will return to [facility], if the [facility] CM can not find a psychological with an IDD diagnosis. RADHA informed Kellen Crisostomo the charge nurse about pt stepping down to [facility] today. SW discussed the importance of medication and treatment compliance in the community. The pt. Plans to follow up with [facility]. Pt has an appointment scheduled with Dr. Saman Samano on 3/22/18.      RADHA Collateral: Mrs. [address] [facility] CM stated she was still waiting for an psychological from [address] [place] [address]. RADHA discussed case with the treating psychiatrist.  The team was going to refer the pt to a group home on 3/6/18. The pt. Was d/c by nursing staff to the [facility] with a bus pass. The pt. 's father was informed about the pt.'s d/c.  RADHA [facility] the  [facility]/c.

## 2018-03-05 NOTE — BH NOTES
Patient valuables in security where brought up by police officers. Patients other valuables listed on sheet were given to patient. Patient discharge instructions were given including two medication prescription. Patient signed all discharged documentation. Patient was given a bus pass. Patient stated that he was going back to the [facility]. Patient was discharged at 1400. He was escorted down stairs by staff.

## 2018-03-05 NOTE — BSMART NOTE
SOCIAL WORK GROUP THERAPY PROGRESS NOTE      Time: 11:00     Group Topic: Coping with Triggers and Ways to Resolve Conflict     Group Participation: SW and tech. s encouraged pt to participate in todays group. Pt. Was provided positive feedback for participation.

## 2018-08-22 PROBLEM — T39.091A SALICYLATE OVERDOSE: Status: ACTIVE | Noted: 2018-08-22

## 2018-08-23 PROBLEM — N17.9 AKI (ACUTE KIDNEY INJURY) (HCC): Status: ACTIVE | Noted: 2018-08-22

## 2018-10-09 ENCOUNTER — HOSPITAL ENCOUNTER (OUTPATIENT)
Dept: LAB | Age: 28
Discharge: HOME | End: 2018-10-09

## 2018-10-09 LAB — XX-LABCORP SPECIMEN COL,LCBCF: NORMAL

## 2018-10-09 PROCEDURE — 99001 SPECIMEN HANDLING PT-LAB: CPT | Performed by: INTERNAL MEDICINE
